# Patient Record
Sex: FEMALE | Race: WHITE | NOT HISPANIC OR LATINO | Employment: FULL TIME | URBAN - METROPOLITAN AREA
[De-identification: names, ages, dates, MRNs, and addresses within clinical notes are randomized per-mention and may not be internally consistent; named-entity substitution may affect disease eponyms.]

---

## 2017-03-21 ENCOUNTER — TRANSCRIBE ORDERS (OUTPATIENT)
Dept: ADMINISTRATIVE | Facility: HOSPITAL | Age: 47
End: 2017-03-21

## 2017-03-21 DIAGNOSIS — Z12.31 VISIT FOR SCREENING MAMMOGRAM: Primary | ICD-10-CM

## 2017-04-12 ENCOUNTER — APPOINTMENT (OUTPATIENT)
Dept: MAMMOGRAPHY | Facility: HOSPITAL | Age: 47
End: 2017-04-12
Attending: OBSTETRICS & GYNECOLOGY

## 2017-04-19 ENCOUNTER — HOSPITAL ENCOUNTER (OUTPATIENT)
Dept: MAMMOGRAPHY | Facility: HOSPITAL | Age: 47
Discharge: HOME | End: 2017-04-19
Attending: OBSTETRICS & GYNECOLOGY | Admitting: OBSTETRICS & GYNECOLOGY

## 2017-04-19 DIAGNOSIS — Z12.31 VISIT FOR SCREENING MAMMOGRAM: ICD-10-CM

## 2017-04-19 PROCEDURE — G0202 SCR MAMMO BI INCL CAD: HCPCS

## 2017-04-19 PROCEDURE — 77063 BREAST TOMOSYNTHESIS BI: CPT

## 2017-05-18 ENCOUNTER — TRANSCRIBE ORDERS (OUTPATIENT)
Dept: ADMINISTRATIVE | Facility: HOSPITAL | Age: 47
End: 2017-05-18

## 2017-05-18 DIAGNOSIS — H57.12 EYE PAIN, LEFT: Primary | ICD-10-CM

## 2017-05-24 ENCOUNTER — HOSPITAL ENCOUNTER (OUTPATIENT)
Dept: MRI IMAGING | Facility: HOSPITAL | Age: 47
Discharge: HOME | End: 2017-05-24
Attending: OPHTHALMOLOGY

## 2017-05-24 ENCOUNTER — HOSPITAL ENCOUNTER (OUTPATIENT)
Dept: MRI IMAGING | Facility: HOSPITAL | Age: 47
Discharge: HOME | End: 2017-05-24
Attending: OPHTHALMOLOGY | Admitting: OPHTHALMOLOGY

## 2017-05-24 DIAGNOSIS — H57.12 EYE PAIN, LEFT: ICD-10-CM

## 2017-05-24 PROCEDURE — 70543 MRI ORBT/FAC/NCK W/O &W/DYE: CPT

## 2017-05-24 PROCEDURE — A9577 INJ MULTIHANCE: HCPCS | Performed by: OPHTHALMOLOGY

## 2017-05-24 PROCEDURE — 70553 MRI BRAIN STEM W/O & W/DYE: CPT

## 2017-05-24 PROCEDURE — 0 GADOBENATE DIMEGLUMINE 529 MG/ML SOLUTION: Performed by: OPHTHALMOLOGY

## 2017-05-24 RX ADMIN — GADOBENATE DIMEGLUMINE 16 ML: 529 INJECTION, SOLUTION INTRAVENOUS at 20:04

## 2017-07-07 ENCOUNTER — APPOINTMENT (OUTPATIENT)
Dept: CT IMAGING | Facility: HOSPITAL | Age: 47
End: 2017-07-07

## 2017-07-07 ENCOUNTER — HOSPITAL ENCOUNTER (EMERGENCY)
Facility: HOSPITAL | Age: 47
Discharge: HOME | End: 2017-07-07
Attending: EMERGENCY MEDICINE | Admitting: EMERGENCY MEDICINE

## 2017-07-07 VITALS
RESPIRATION RATE: 18 BRPM | HEART RATE: 72 BPM | WEIGHT: 168 LBS | BODY MASS INDEX: 27 KG/M2 | SYSTOLIC BLOOD PRESSURE: 141 MMHG | HEIGHT: 66 IN | TEMPERATURE: 98.4 F | OXYGEN SATURATION: 98 % | DIASTOLIC BLOOD PRESSURE: 81 MMHG

## 2017-07-07 DIAGNOSIS — R19.8 CHANGE IN BOWEL FUNCTION: Primary | ICD-10-CM

## 2017-07-07 DIAGNOSIS — R10.9 ABDOMINAL CRAMPING: ICD-10-CM

## 2017-07-07 LAB
ALBUMIN SERPL-MCNC: 4.5 G/DL (ref 3.5–5.2)
ALBUMIN/GLOB SERPL: 1.6 G/DL
ALP SERPL-CCNC: 43 U/L (ref 39–117)
ALT SERPL W P-5'-P-CCNC: 11 U/L (ref 1–33)
ANION GAP SERPL CALCULATED.3IONS-SCNC: 15.1 MMOL/L
AST SERPL-CCNC: 13 U/L (ref 1–32)
BACTERIA UR QL AUTO: ABNORMAL /HPF
BASOPHILS # BLD AUTO: 0.02 10*3/MM3 (ref 0–0.2)
BASOPHILS NFR BLD AUTO: 0.4 % (ref 0–1.5)
BILIRUB SERPL-MCNC: 0.2 MG/DL (ref 0.1–1.2)
BILIRUB UR QL STRIP: NEGATIVE
BUN BLD-MCNC: 8 MG/DL (ref 6–20)
BUN/CREAT SERPL: 10.3 (ref 7–25)
CALCIUM SPEC-SCNC: 9.8 MG/DL (ref 8.6–10.5)
CHLORIDE SERPL-SCNC: 101 MMOL/L (ref 98–107)
CLARITY UR: CLEAR
CO2 SERPL-SCNC: 23.9 MMOL/L (ref 22–29)
COLOR UR: YELLOW
CREAT BLD-MCNC: 0.78 MG/DL (ref 0.57–1)
DEPRECATED RDW RBC AUTO: 42.1 FL (ref 37–54)
EOSINOPHIL # BLD AUTO: 0.04 10*3/MM3 (ref 0–0.7)
EOSINOPHIL NFR BLD AUTO: 0.9 % (ref 0.3–6.2)
ERYTHROCYTE [DISTWIDTH] IN BLOOD BY AUTOMATED COUNT: 12.8 % (ref 11.7–13)
GFR SERPL CREATININE-BSD FRML MDRD: 79 ML/MIN/1.73
GLOBULIN UR ELPH-MCNC: 2.9 GM/DL
GLUCOSE BLD-MCNC: 98 MG/DL (ref 65–99)
GLUCOSE UR STRIP-MCNC: NEGATIVE MG/DL
HCG SERPL QL: NEGATIVE
HCT VFR BLD AUTO: 39 % (ref 35.6–45.5)
HGB BLD-MCNC: 12.7 G/DL (ref 11.9–15.5)
HGB UR QL STRIP.AUTO: ABNORMAL
HOLD SPECIMEN: NORMAL
HYALINE CASTS UR QL AUTO: ABNORMAL /LPF
IMM GRANULOCYTES # BLD: 0 10*3/MM3 (ref 0–0.03)
IMM GRANULOCYTES NFR BLD: 0 % (ref 0–0.5)
KETONES UR QL STRIP: NEGATIVE
LEUKOCYTE ESTERASE UR QL STRIP.AUTO: NEGATIVE
LIPASE SERPL-CCNC: 27 U/L (ref 13–60)
LYMPHOCYTES # BLD AUTO: 1.43 10*3/MM3 (ref 0.9–4.8)
LYMPHOCYTES NFR BLD AUTO: 30.6 % (ref 19.6–45.3)
MCH RBC QN AUTO: 29.5 PG (ref 26.9–32)
MCHC RBC AUTO-ENTMCNC: 32.6 G/DL (ref 32.4–36.3)
MCV RBC AUTO: 90.5 FL (ref 80.5–98.2)
MONOCYTES # BLD AUTO: 0.27 10*3/MM3 (ref 0.2–1.2)
MONOCYTES NFR BLD AUTO: 5.8 % (ref 5–12)
NEUTROPHILS # BLD AUTO: 2.92 10*3/MM3 (ref 1.9–8.1)
NEUTROPHILS NFR BLD AUTO: 62.3 % (ref 42.7–76)
NITRITE UR QL STRIP: NEGATIVE
PH UR STRIP.AUTO: 6.5 [PH] (ref 5–8)
PLATELET # BLD AUTO: 303 10*3/MM3 (ref 140–500)
PMV BLD AUTO: 10.9 FL (ref 6–12)
POTASSIUM BLD-SCNC: 3.7 MMOL/L (ref 3.5–5.2)
PROT SERPL-MCNC: 7.4 G/DL (ref 6–8.5)
PROT UR QL STRIP: NEGATIVE
RBC # BLD AUTO: 4.31 10*6/MM3 (ref 3.9–5.2)
RBC # UR: ABNORMAL /HPF
REF LAB TEST METHOD: ABNORMAL
SODIUM BLD-SCNC: 140 MMOL/L (ref 136–145)
SP GR UR STRIP: <1.005 (ref 1–1.03)
SQUAMOUS #/AREA URNS HPF: ABNORMAL /HPF
UROBILINOGEN UR QL STRIP: ABNORMAL
WBC NRBC COR # BLD: 4.68 10*3/MM3 (ref 4.5–10.7)
WBC UR QL AUTO: ABNORMAL /HPF
WHOLE BLOOD HOLD SPECIMEN: NORMAL
WHOLE BLOOD HOLD SPECIMEN: NORMAL

## 2017-07-07 PROCEDURE — 85025 COMPLETE CBC W/AUTO DIFF WBC: CPT | Performed by: EMERGENCY MEDICINE

## 2017-07-07 PROCEDURE — 74177 CT ABD & PELVIS W/CONTRAST: CPT

## 2017-07-07 PROCEDURE — 83690 ASSAY OF LIPASE: CPT | Performed by: EMERGENCY MEDICINE

## 2017-07-07 PROCEDURE — 84703 CHORIONIC GONADOTROPIN ASSAY: CPT | Performed by: EMERGENCY MEDICINE

## 2017-07-07 PROCEDURE — 0 IOPAMIDOL 61 % SOLUTION: Performed by: EMERGENCY MEDICINE

## 2017-07-07 PROCEDURE — 96360 HYDRATION IV INFUSION INIT: CPT

## 2017-07-07 PROCEDURE — 80053 COMPREHEN METABOLIC PANEL: CPT | Performed by: EMERGENCY MEDICINE

## 2017-07-07 PROCEDURE — 36415 COLL VENOUS BLD VENIPUNCTURE: CPT | Performed by: EMERGENCY MEDICINE

## 2017-07-07 PROCEDURE — 81001 URINALYSIS AUTO W/SCOPE: CPT | Performed by: EMERGENCY MEDICINE

## 2017-07-07 PROCEDURE — 99284 EMERGENCY DEPT VISIT MOD MDM: CPT

## 2017-07-07 RX ORDER — SODIUM CHLORIDE 0.9 % (FLUSH) 0.9 %
10 SYRINGE (ML) INJECTION AS NEEDED
Status: DISCONTINUED | OUTPATIENT
Start: 2017-07-07 | End: 2017-07-07 | Stop reason: HOSPADM

## 2017-07-07 RX ADMIN — IOPAMIDOL 85 ML: 612 INJECTION, SOLUTION INTRAVENOUS at 12:07

## 2017-07-07 RX ADMIN — SODIUM CHLORIDE 1000 ML: 9 INJECTION, SOLUTION INTRAVENOUS at 12:28

## 2017-07-07 NOTE — ED PROVIDER NOTES
EMERGENCY DEPARTMENT ENCOUNTER    CHIEF COMPLAINT  Chief Complaint: Abdominal Pain  History given by: Patient  History limited by: Nothing  Room Number: 31/31  PMD: Naman Zacarias MD      HPI:  Pt is a 47 y.o. female who presents to the ED complaining of progressively worsening LUQ discomfort which began 2-3 days ago and worsened this morning while she was at work. The patient has also experienced unusual constipation and her last normal BM was approximately 2 weeks ago after an enema. Patient has been taking Miralax and laxatives over the past two weeks and she's only been producing very small amount of stool. Patient states that she has still been passing gas but denies any fever, chills, nausea or vomiting since onset. She notes that she is currently on her menstrual cycle and has no other complaints.      Duration:  2-3 days  Onset: Gradual  Timing: Constant  Location: LUQ  Radiation: None  Quality: Dull  Intensity/Severity: Moderate  Progression: Worsened  Associated Symptoms: Abdominal pain, constipation   Aggravating Factors: Unknown  Alleviating Factors: Unknown  Previous Episodes: None mentioned  Treatment before arrival: Miralax, laxative, enema      PAST MEDICAL HISTORY  Active Ambulatory Problems     Diagnosis Date Noted   • No Active Ambulatory Problems     Resolved Ambulatory Problems     Diagnosis Date Noted   • No Resolved Ambulatory Problems     No Additional Past Medical History       PAST SURGICAL HISTORY  Past Surgical History:   Procedure Laterality Date   • BREAST BIOPSY     • LEEP         FAMILY HISTORY  History reviewed. No pertinent family history.    SOCIAL HISTORY  Social History     Social History   • Marital status:      Spouse name: N/A   • Number of children: N/A   • Years of education: N/A     Occupational History   • Not on file.     Social History Main Topics   • Smoking status: Never Smoker   • Smokeless tobacco: Not on file   • Alcohol use No   • Drug use: No   •  Sexual activity: Not on file     Other Topics Concern   • Not on file     Social History Narrative   • No narrative on file       ALLERGIES  Review of patient's allergies indicates no known allergies.    REVIEW OF SYSTEMS  Review of Systems   Constitutional: Negative for chills.   HENT: Negative for congestion, rhinorrhea and sore throat.    Eyes: Negative for pain.   Respiratory: Negative for cough and shortness of breath.    Cardiovascular: Negative for chest pain, palpitations and leg swelling.   Gastrointestinal: Positive for abdominal pain and constipation. Negative for diarrhea, nausea and vomiting.   Genitourinary: Negative for difficulty urinating, dysuria, flank pain and frequency.   Musculoskeletal: Negative for myalgias, neck pain and neck stiffness.   Skin: Negative for rash.   Neurological: Negative for dizziness, speech difficulty, weakness, light-headedness, numbness and headaches.   Psychiatric/Behavioral: Negative.    All other systems reviewed and are negative.      PHYSICAL EXAM  ED Triage Vitals   Temp Heart Rate Resp BP SpO2   07/07/17 1101 07/07/17 1101 07/07/17 1101 07/07/17 1106 07/07/17 1101   98 °F (36.7 °C) 103 18 182/106 100 %      Temp src Heart Rate Source Patient Position BP Location FiO2 (%)   07/07/17 1101 07/07/17 1101 07/07/17 1106 -- --   Tympanic Monitor Lying         Physical Exam   Constitutional: She is oriented to person, place, and time and well-developed, well-nourished, and in no distress. No distress.   HENT:   Head: Normocephalic.   Eyes: EOM are normal. Pupils are equal, round, and reactive to light.   Neck: Normal range of motion.   Cardiovascular: Normal rate and regular rhythm.    No murmur heard.  Pulmonary/Chest: Effort normal and breath sounds normal. No respiratory distress.   Abdominal: Soft. There is tenderness in the right lower quadrant and left upper quadrant. There is no rebound and no guarding.   Musculoskeletal: Normal range of motion. She exhibits no  edema.   Neurological: She is alert and oriented to person, place, and time.   Skin: Skin is warm and dry. No rash noted.   Psychiatric: Mood and affect normal.   Nursing note and vitals reviewed.      LAB RESULTS  Lab Results (last 24 hours)     Procedure Component Value Units Date/Time    CBC & Differential [[id number]] Collected:  07/07/17 1116    Specimen:  Blood Updated:  07/07/17 1136    Narrative:       The following orders were created for panel order CBC & Differential.  Procedure                               Abnormality         Status                     ---------                               -----------         ------                     CBC Auto Differential[[id number]]        Normal              Final result                 Please view results for these tests on the individual orders.    Comprehensive Metabolic Panel [[id number]] Collected:  07/07/17 1116    Specimen:  Blood Updated:  07/07/17 1147     Glucose 98 mg/dL      BUN 8 mg/dL      Creatinine 0.78 mg/dL      Sodium 140 mmol/L      Potassium 3.7 mmol/L      Chloride 101 mmol/L      CO2 23.9 mmol/L      Calcium 9.8 mg/dL      Total Protein 7.4 g/dL      Albumin 4.50 g/dL      ALT (SGPT) 11 U/L      AST (SGOT) 13 U/L      Alkaline Phosphatase 43 U/L      Total Bilirubin 0.2 mg/dL      eGFR Non African Amer 79 mL/min/1.73      Globulin 2.9 gm/dL      A/G Ratio 1.6 g/dL      BUN/Creatinine Ratio 10.3     Anion Gap 15.1 mmol/L     Lipase [[id number]]  (Normal) Collected:  07/07/17 1116    Specimen:  Blood Updated:  07/07/17 1147     Lipase 27 U/L     hCG, Serum, Qualitative [[id number]]  (Normal) Collected:  07/07/17 1116    Specimen:  Blood Updated:  07/07/17 1150     HCG Qualitative Negative    CBC Auto Differential [[id number]]  (Normal) Collected:  07/07/17 1116    Specimen:  Blood Updated:  07/07/17 1136     WBC 4.68 10*3/mm3      RBC 4.31 10*6/mm3      Hemoglobin 12.7 g/dL      Hematocrit 39.0 %      MCV 90.5 fL      MCH 29.5 pg      MCHC 32.6 g/dL       RDW 12.8 %      RDW-SD 42.1 fl      MPV 10.9 fL      Platelets 303 10*3/mm3      Neutrophil % 62.3 %      Lymphocyte % 30.6 %      Monocyte % 5.8 %      Eosinophil % 0.9 %      Basophil % 0.4 %      Immature Grans % 0.0 %      Neutrophils, Absolute 2.92 10*3/mm3      Lymphocytes, Absolute 1.43 10*3/mm3      Monocytes, Absolute 0.27 10*3/mm3      Eosinophils, Absolute 0.04 10*3/mm3      Basophils, Absolute 0.02 10*3/mm3      Immature Grans, Absolute 0.00 10*3/mm3     Urinalysis With / Culture If Indicated [[id number]]  (Abnormal) Collected:  07/07/17 1221    Specimen:  Urine from Urine, Clean Catch Updated:  07/07/17 1241     Color, UA Yellow     Appearance, UA Clear     pH, UA 6.5     Specific Gravity, UA <1.005 (L)     Glucose, UA Negative     Ketones, UA Negative     Bilirubin, UA Negative     Blood, UA Moderate (2+) (A)     Protein, UA Negative     Leuk Esterase, UA Negative     Nitrite, UA Negative     Urobilinogen, UA 0.2 E.U./dL    Urinalysis, Microscopic Only [[id number]]  (Abnormal) Collected:  07/07/17 1221    Specimen:  Urine from Urine, Clean Catch Updated:  07/07/17 1315     RBC, UA 6-12 (A) /HPF      WBC, UA 0-2 /HPF      Bacteria, UA None Seen /HPF      Squamous Epithelial Cells, UA 7-12 (A) /HPF      Hyaline Casts, UA 3-6 /LPF      Methodology Manual Light Microscopy          I ordered the above labs and reviewed the results    RADIOLOGY  CT Abdomen Pelvis With Contrast   Preliminary Result   No acute abnormality is seen.       Discussed with Dr. Rousseau.             12:35  Reviewed CT abd/pel - Negative acute. Normal amount of stool in colon. No obstruction. Independently viewed by me. Interpreted by radiologist. Discussed with     I ordered the above noted radiological studies. Interpreted by radiologist. Discussed with radiologist (). Reviewed by me in PACS.       PROCEDURES  Procedures      PROGRESS AND CONSULTS  ED Course     11:08  Ordered Labs for further evaluation.      11:48  Ordered CT abd/pel for further evaluation. Ordered IVF for hydration.     14:42  Rechecked the patient and she is resting comfortably. Discussed the patient's pertinent labs and imaging results, including CT abd/pel and labs show nothing acute. Discussed plan to discharge the patient home and recommended follow up with her PCP as needed. Patient agrees with the plan and all questions were addressed.     Latest vital signs   BP- 140/83 HR- 70 Temp- 98 °F (36.7 °C) (Tympanic) O2 sat- 99%      MEDICAL DECISION MAKING  Results were reviewed/discussed with the patient and they were also made aware of online access. Pt also made aware that some labs, such as cultures, will not be resulted during ER visit and follow up with PMD is necessary.     MDM  Number of Diagnoses or Management Options  Abdominal cramping:   Change in bowel function:   Diagnosis management comments: Patient presented the ER complaining of constipation and abdominal cramping.  CT of the abdomen and pelvis did not show any evidence of constipation, colitis, diverticulitis, or bowel obstruction.  Labs were unremarkable.  Patient be discharged with prescription for magnesium citrate.  She was advised to eat a high-fiber diet and drink plenty of fluids.       Amount and/or Complexity of Data Reviewed  Clinical lab tests: reviewed and ordered (Labs unremarkable)  Tests in the radiology section of CPT®: reviewed and ordered (CT abd/pel - Negative acute. Normal amount of stool in colon. No obstruction. )  Discussion of test results with the performing providers: yes  Decide to obtain previous medical records or to obtain history from someone other than the patient: yes  Review and summarize past medical records: yes (Last seen in ER Aug 2015 for right foot injury and atypical chest pain. )    Patient Progress  Patient progress: stable         DIAGNOSIS  Final diagnoses:   Change in bowel function   Abdominal cramping        DISPOSITION  DISCHARGE    Patient discharged in stable condition.    Reviewed implications of results, diagnosis, meds, responsibility to follow up, warning signs and symptoms of possible worsening, potential complications and reasons to return to ER, including worsening abdominal pain.     Patient/Family voiced understanding of above instructions.    Discussed plan for discharge, as there is no emergent indication for admission.  Pt/family is agreeable and understands need for follow up and repeat testing.  Pt is aware that discharge does not mean that nothing is wrong but it indicates no emergency is present that requires admission and they must continue care with follow-up as given below or physician of their choice.     FOLLOW-UP  Naman Zacarias MD  [address] [address]  [phone]    Call in 3 days  As needed         Medication List      New Prescriptions          magnesium citrate solution   Take 148 mL by mouth As Needed (constipation). May repeat dose x 1 as   needed               Latest Documented Vital Signs:  As of 2:48 PM  BP- 140/83 HR- 70 Temp- 98 °F (36.7 °C) (Tympanic) O2 sat- 99%    --  Documentation assistance provided by teresa Emerson for .  Information recorded by the scrlidiae was done at my direction and has been verified and validated by me.        Tobias Emerson  07/07/17 6216       Palomo Rousseau MD  07/07/17 4789

## 2017-07-07 NOTE — DISCHARGE INSTRUCTIONS
Drink plenty of fluids.  Take medication as directed.  Follow-up with your doctor next week if symptoms persist.  Return to emergency department for worsening pain, vomiting, fever, blood in your stool, or other concern.

## 2017-07-07 NOTE — ED TRIAGE NOTES
Pt to ED for LLQ abdominal pain and pressure and constipation. States last normal bm was three weeks ago after enema. States took three laxatives yesterday with minimal output

## 2017-09-11 ENCOUNTER — LAB (OUTPATIENT)
Dept: LAB | Facility: HOSPITAL | Age: 47
End: 2017-09-11

## 2017-09-11 ENCOUNTER — TRANSCRIBE ORDERS (OUTPATIENT)
Dept: ADMINISTRATIVE | Facility: HOSPITAL | Age: 47
End: 2017-09-11

## 2017-09-11 DIAGNOSIS — R51.9 HEADACHE, UNSPECIFIED HEADACHE TYPE: ICD-10-CM

## 2017-09-11 DIAGNOSIS — R51.9 HEADACHE, UNSPECIFIED HEADACHE TYPE: Primary | ICD-10-CM

## 2017-09-11 LAB
CHOLEST SERPL-MCNC: 197 MG/DL (ref 0–200)
HBA1C MFR BLD: 5.3 % (ref 4.8–5.6)
HDLC SERPL-MCNC: 70 MG/DL (ref 40–60)
LDLC SERPL CALC-MCNC: 97 MG/DL (ref 0–100)
LDLC/HDLC SERPL: 1.39 {RATIO}
T-UPTAKE NFR SERPL: 1.33 TBI (ref 0.8–1.3)
T4 SERPL-MCNC: 7.76 MCG/DL (ref 4.5–11.7)
TRIGL SERPL-MCNC: 150 MG/DL (ref 0–150)
TSH SERPL DL<=0.05 MIU/L-ACNC: 2.7 MIU/ML (ref 0.27–4.2)
VIT B12 BLD-MCNC: 237 PG/ML (ref 211–946)
VLDLC SERPL-MCNC: 30 MG/DL (ref 5–40)

## 2017-09-11 PROCEDURE — 84436 ASSAY OF TOTAL THYROXINE: CPT

## 2017-09-11 PROCEDURE — 82746 ASSAY OF FOLIC ACID SERUM: CPT

## 2017-09-11 PROCEDURE — 80061 LIPID PANEL: CPT

## 2017-09-11 PROCEDURE — 84479 ASSAY OF THYROID (T3 OR T4): CPT

## 2017-09-11 PROCEDURE — 83036 HEMOGLOBIN GLYCOSYLATED A1C: CPT

## 2017-09-11 PROCEDURE — 82607 VITAMIN B-12: CPT

## 2017-09-11 PROCEDURE — 36415 COLL VENOUS BLD VENIPUNCTURE: CPT

## 2017-09-11 PROCEDURE — 84443 ASSAY THYROID STIM HORMONE: CPT

## 2017-09-12 LAB — FOLATE SERPL-MCNC: 8.14 NG/ML (ref 4.78–24.2)

## 2017-12-07 ENCOUNTER — TRANSCRIBE ORDERS (OUTPATIENT)
Dept: ADMINISTRATIVE | Facility: HOSPITAL | Age: 47
End: 2017-12-07

## 2017-12-18 ENCOUNTER — HOSPITAL ENCOUNTER (OUTPATIENT)
Dept: ULTRASOUND IMAGING | Facility: HOSPITAL | Age: 47
Discharge: HOME | End: 2017-12-18

## 2017-12-18 ENCOUNTER — HOSPITAL ENCOUNTER (OUTPATIENT)
Dept: MAMMOGRAPHY | Facility: HOSPITAL | Age: 47
Discharge: HOME | End: 2017-12-18
Admitting: OBSTETRICS & GYNECOLOGY

## 2017-12-18 DIAGNOSIS — IMO0002 MASS: ICD-10-CM

## 2017-12-18 PROCEDURE — G0206 DX MAMMO INCL CAD UNI: HCPCS

## 2017-12-18 PROCEDURE — 76642 ULTRASOUND BREAST LIMITED: CPT

## 2018-03-26 ENCOUNTER — TRANSCRIBE ORDERS (OUTPATIENT)
Dept: LAB | Facility: HOSPITAL | Age: 48
End: 2018-03-26

## 2018-03-26 ENCOUNTER — LAB (OUTPATIENT)
Dept: LAB | Facility: HOSPITAL | Age: 48
End: 2018-03-26

## 2018-03-26 DIAGNOSIS — E53.8 VITAMIN B 12 DEFICIENCY: ICD-10-CM

## 2018-03-26 DIAGNOSIS — E53.8 VITAMIN B 12 DEFICIENCY: Primary | ICD-10-CM

## 2018-03-26 LAB
FOLATE SERPL-MCNC: 9.38 NG/ML (ref 4.78–24.2)
VIT B12 BLD-MCNC: 333 PG/ML (ref 211–946)

## 2018-03-26 PROCEDURE — 36415 COLL VENOUS BLD VENIPUNCTURE: CPT

## 2018-03-26 PROCEDURE — 82607 VITAMIN B-12: CPT

## 2018-03-26 PROCEDURE — 82746 ASSAY OF FOLIC ACID SERUM: CPT

## 2018-06-05 ENCOUNTER — TRANSCRIBE ORDERS (OUTPATIENT)
Dept: ADMINISTRATIVE | Facility: HOSPITAL | Age: 48
End: 2018-06-05

## 2018-06-05 DIAGNOSIS — R51.9 NONINTRACTABLE HEADACHE, UNSPECIFIED CHRONICITY PATTERN, UNSPECIFIED HEADACHE TYPE: ICD-10-CM

## 2018-06-05 DIAGNOSIS — R93.89 ABNORMAL MRI: Primary | ICD-10-CM

## 2018-06-14 ENCOUNTER — HOSPITAL ENCOUNTER (OUTPATIENT)
Dept: MRI IMAGING | Facility: HOSPITAL | Age: 48
Discharge: HOME | End: 2018-06-14
Admitting: PSYCHIATRY & NEUROLOGY

## 2018-06-14 DIAGNOSIS — R93.89 ABNORMAL MRI: ICD-10-CM

## 2018-06-14 DIAGNOSIS — R51.9 NONINTRACTABLE HEADACHE, UNSPECIFIED CHRONICITY PATTERN, UNSPECIFIED HEADACHE TYPE: ICD-10-CM

## 2018-06-14 PROCEDURE — 0 GADOBENATE DIMEGLUMINE 529 MG/ML SOLUTION: Performed by: PSYCHIATRY & NEUROLOGY

## 2018-06-14 PROCEDURE — 70553 MRI BRAIN STEM W/O & W/DYE: CPT

## 2018-06-14 PROCEDURE — A9577 INJ MULTIHANCE: HCPCS | Performed by: PSYCHIATRY & NEUROLOGY

## 2018-06-14 PROCEDURE — 82565 ASSAY OF CREATININE: CPT

## 2018-06-14 RX ADMIN — GADOBENATE DIMEGLUMINE 15 ML: 529 INJECTION, SOLUTION INTRAVENOUS at 18:36

## 2018-06-15 LAB — CREAT BLDA-MCNC: 0.7 MG/DL (ref 0.6–1.3)

## 2018-10-02 PROBLEM — G43.909 MIGRAINES: Status: ACTIVE | Noted: 2018-10-02

## 2018-10-02 PROBLEM — I10 ESSENTIAL HYPERTENSION: Status: ACTIVE | Noted: 2018-10-02

## 2018-10-02 PROBLEM — D64.9 ANEMIA: Status: ACTIVE | Noted: 2018-10-02

## 2018-10-02 PROBLEM — K59.00 CONSTIPATION: Status: ACTIVE | Noted: 2018-10-02

## 2018-10-02 PROBLEM — L71.9 ROSACEA: Status: ACTIVE | Noted: 2018-10-02

## 2018-10-02 PROBLEM — K58.9 IBS (IRRITABLE BOWEL SYNDROME): Status: ACTIVE | Noted: 2018-10-02

## 2018-10-02 PROBLEM — C53.9 CERVICAL CA (HCC): Status: ACTIVE | Noted: 2018-10-02

## 2018-10-02 PROBLEM — F41.9 CHRONIC ANXIETY: Status: ACTIVE | Noted: 2018-10-02

## 2018-10-02 RX ORDER — DESOGESTREL AND ETHINYL ESTRADIOL 0.15-0.03
1 KIT ORAL
COMMUNITY
Start: 2017-12-05 | End: 2022-02-02

## 2018-10-02 RX ORDER — CYANOCOBALAMIN (VITAMIN B-12) 2000 MCG
2000 TABLET ORAL
COMMUNITY
Start: 2018-08-27 | End: 2022-02-02

## 2018-10-02 RX ORDER — PROPRANOLOL HYDROCHLORIDE 80 MG/1
80 CAPSULE, EXTENDED RELEASE ORAL
COMMUNITY
Start: 2018-03-13 | End: 2022-02-02

## 2018-10-03 ENCOUNTER — OFFICE VISIT (OUTPATIENT)
Dept: SURGERY | Facility: CLINIC | Age: 48
End: 2018-10-03

## 2018-10-03 VITALS
HEIGHT: 66 IN | TEMPERATURE: 98.1 F | OXYGEN SATURATION: 98 % | BODY MASS INDEX: 29.73 KG/M2 | WEIGHT: 185 LBS | DIASTOLIC BLOOD PRESSURE: 80 MMHG | HEART RATE: 89 BPM | SYSTOLIC BLOOD PRESSURE: 115 MMHG | RESPIRATION RATE: 18 BRPM

## 2018-10-03 DIAGNOSIS — Z83.71 FAMILY HISTORY OF POLYPS IN THE COLON: ICD-10-CM

## 2018-10-03 DIAGNOSIS — K62.89 NODULE OF ANUS: Primary | ICD-10-CM

## 2018-10-03 PROCEDURE — 99244 OFF/OP CNSLTJ NEW/EST MOD 40: CPT | Performed by: COLON & RECTAL SURGERY

## 2018-10-03 PROCEDURE — 46600 DIAGNOSTIC ANOSCOPY SPX: CPT | Performed by: COLON & RECTAL SURGERY

## 2018-10-03 RX ORDER — SODIUM CHLORIDE, SODIUM LACTATE, POTASSIUM CHLORIDE, CALCIUM CHLORIDE 600; 310; 30; 20 MG/100ML; MG/100ML; MG/100ML; MG/100ML
30 INJECTION, SOLUTION INTRAVENOUS CONTINUOUS
Status: CANCELLED | OUTPATIENT
Start: 2018-10-03

## 2019-01-19 ENCOUNTER — HOSPITAL ENCOUNTER (OUTPATIENT)
Dept: GENERAL RADIOLOGY | Facility: HOSPITAL | Age: 49
Discharge: HOME | End: 2019-01-19

## 2019-01-19 ENCOUNTER — HOSPITAL ENCOUNTER (OUTPATIENT)
Dept: GENERAL RADIOLOGY | Facility: HOSPITAL | Age: 49
Discharge: HOME | End: 2019-01-19
Admitting: INTERNAL MEDICINE

## 2019-01-19 DIAGNOSIS — R52 PAIN: ICD-10-CM

## 2019-01-19 PROCEDURE — 71046 X-RAY EXAM CHEST 2 VIEWS: CPT

## 2019-01-19 PROCEDURE — 72050 X-RAY EXAM NECK SPINE 4/5VWS: CPT

## 2019-02-23 ENCOUNTER — LAB REQUISITION (OUTPATIENT)
Dept: LAB | Facility: HOSPITAL | Age: 49
End: 2019-02-23

## 2019-02-23 DIAGNOSIS — Z00.00 ENCOUNTER FOR GENERAL ADULT MEDICAL EXAMINATION WITHOUT ABNORMAL FINDINGS: ICD-10-CM

## 2019-02-23 LAB
25(OH)D3 SERPL-MCNC: 12.7 NG/ML (ref 30–100)
ALBUMIN SERPL-MCNC: 4.5 G/DL (ref 3.5–5.2)
ALBUMIN/GLOB SERPL: 1.7 G/DL
ALP SERPL-CCNC: 39 U/L (ref 39–117)
ALT SERPL W P-5'-P-CCNC: 13 U/L (ref 1–33)
ANION GAP SERPL CALCULATED.3IONS-SCNC: 14.9 MMOL/L
AST SERPL-CCNC: 10 U/L (ref 1–32)
BASOPHILS # BLD AUTO: 0.04 10*3/MM3 (ref 0–0.2)
BASOPHILS NFR BLD AUTO: 0.6 % (ref 0–1.5)
BILIRUB SERPL-MCNC: 0.3 MG/DL (ref 0.1–1.2)
BILIRUB UR QL STRIP: NEGATIVE
BUN BLD-MCNC: 10 MG/DL (ref 6–20)
BUN/CREAT SERPL: 13.2 (ref 7–25)
CALCIUM SPEC-SCNC: 9.4 MG/DL (ref 8.6–10.5)
CHLORIDE SERPL-SCNC: 102 MMOL/L (ref 98–107)
CHOLEST SERPL-MCNC: 223 MG/DL (ref 0–200)
CLARITY UR: ABNORMAL
CO2 SERPL-SCNC: 23.1 MMOL/L (ref 22–29)
COLOR UR: YELLOW
CREAT BLD-MCNC: 0.76 MG/DL (ref 0.57–1)
DEPRECATED RDW RBC AUTO: 42.9 FL (ref 37–54)
EOSINOPHIL # BLD AUTO: 0.05 10*3/MM3 (ref 0–0.4)
EOSINOPHIL NFR BLD AUTO: 0.8 % (ref 0.3–6.2)
ERYTHROCYTE [DISTWIDTH] IN BLOOD BY AUTOMATED COUNT: 12.5 % (ref 12.3–15.4)
GFR SERPL CREATININE-BSD FRML MDRD: 81 ML/MIN/1.73
GLOBULIN UR ELPH-MCNC: 2.7 GM/DL
GLUCOSE BLD-MCNC: 95 MG/DL (ref 65–99)
GLUCOSE UR STRIP-MCNC: NEGATIVE MG/DL
HCT VFR BLD AUTO: 40.6 % (ref 34–46.6)
HDLC SERPL-MCNC: 66 MG/DL (ref 40–60)
HGB BLD-MCNC: 12.7 G/DL (ref 12–15.9)
HGB UR QL STRIP.AUTO: NEGATIVE
IMM GRANULOCYTES # BLD AUTO: 0.01 10*3/MM3 (ref 0–0.05)
IMM GRANULOCYTES NFR BLD AUTO: 0.2 % (ref 0–0.5)
KETONES UR QL STRIP: NEGATIVE
LDLC SERPL CALC-MCNC: 119 MG/DL (ref 0–100)
LDLC/HDLC SERPL: 1.81 {RATIO}
LEUKOCYTE ESTERASE UR QL STRIP.AUTO: ABNORMAL
LYMPHOCYTES # BLD AUTO: 1.52 10*3/MM3 (ref 0.7–3.1)
LYMPHOCYTES NFR BLD AUTO: 23 % (ref 19.6–45.3)
MCH RBC QN AUTO: 29.1 PG (ref 26.6–33)
MCHC RBC AUTO-ENTMCNC: 31.3 G/DL (ref 31.5–35.7)
MCV RBC AUTO: 93.1 FL (ref 79–97)
MONOCYTES # BLD AUTO: 0.37 10*3/MM3 (ref 0.1–0.9)
MONOCYTES NFR BLD AUTO: 5.6 % (ref 5–12)
NEUTROPHILS # BLD AUTO: 4.61 10*3/MM3 (ref 1.4–7)
NEUTROPHILS NFR BLD AUTO: 69.8 % (ref 42.7–76)
NITRITE UR QL STRIP: NEGATIVE
NRBC BLD AUTO-RTO: 0 /100 WBC (ref 0–0)
PH UR STRIP.AUTO: <=5 [PH] (ref 5–8)
PLATELET # BLD AUTO: 354 10*3/MM3 (ref 140–450)
PMV BLD AUTO: 10.6 FL (ref 6–12)
POTASSIUM BLD-SCNC: 4.3 MMOL/L (ref 3.5–5.2)
PROT SERPL-MCNC: 7.2 G/DL (ref 6–8.5)
PROT UR QL STRIP: NEGATIVE
RBC # BLD AUTO: 4.36 10*6/MM3 (ref 3.77–5.28)
SODIUM BLD-SCNC: 140 MMOL/L (ref 136–145)
SP GR UR STRIP: 1.02 (ref 1–1.03)
TRIGL SERPL-MCNC: 188 MG/DL (ref 0–150)
UROBILINOGEN UR QL STRIP: ABNORMAL
VIT B12 BLD-MCNC: 521 PG/ML (ref 211–946)
VLDLC SERPL-MCNC: 37.6 MG/DL (ref 5–40)
WBC NRBC COR # BLD: 6.6 10*3/MM3 (ref 3.4–10.8)

## 2019-02-23 PROCEDURE — 82306 VITAMIN D 25 HYDROXY: CPT | Performed by: INTERNAL MEDICINE

## 2019-02-23 PROCEDURE — 85025 COMPLETE CBC W/AUTO DIFF WBC: CPT | Performed by: INTERNAL MEDICINE

## 2019-02-23 PROCEDURE — 80053 COMPREHEN METABOLIC PANEL: CPT | Performed by: INTERNAL MEDICINE

## 2019-02-23 PROCEDURE — 80061 LIPID PANEL: CPT | Performed by: INTERNAL MEDICINE

## 2019-02-23 PROCEDURE — 81003 URINALYSIS AUTO W/O SCOPE: CPT | Performed by: INTERNAL MEDICINE

## 2019-02-23 PROCEDURE — 82607 VITAMIN B-12: CPT | Performed by: INTERNAL MEDICINE

## 2019-02-23 PROCEDURE — 83921 ORGANIC ACID SINGLE QUANT: CPT | Performed by: INTERNAL MEDICINE

## 2019-02-28 LAB
Lab: NORMAL
METHYLMALONATE SERPL-SCNC: 94 NMOL/L (ref 0–378)

## 2019-04-18 ENCOUNTER — TRANSCRIBE ORDERS (OUTPATIENT)
Dept: PHYSICAL THERAPY | Facility: CLINIC | Age: 49
End: 2019-04-18

## 2019-04-18 ENCOUNTER — TREATMENT (OUTPATIENT)
Dept: PHYSICAL THERAPY | Facility: CLINIC | Age: 49
End: 2019-04-18

## 2019-04-18 DIAGNOSIS — M54.6 PAIN OF THORACIC FACET JOINT: ICD-10-CM

## 2019-04-18 DIAGNOSIS — M54.2 MUSCULOSKELETAL NECK PAIN: Primary | ICD-10-CM

## 2019-04-18 PROCEDURE — 97140 MANUAL THERAPY 1/> REGIONS: CPT | Performed by: PHYSICAL THERAPIST

## 2019-04-18 PROCEDURE — 97110 THERAPEUTIC EXERCISES: CPT | Performed by: PHYSICAL THERAPIST

## 2019-04-18 PROCEDURE — 97161 PT EVAL LOW COMPLEX 20 MIN: CPT | Performed by: PHYSICAL THERAPIST

## 2019-04-18 NOTE — PROGRESS NOTES
Physical Therapy Initial Evaluation and Plan of Care      Patient: Maryana Huber   : 1970  Diagnosis/ICD-10 Code:  Musculoskeletal neck pain [M54.2]  Referring practitioner: Self Referring    Subjective Evaluation    History of Present Illness  Mechanism of injury: Insidious onset L chest/thoracic. Neck hurts as well.  Work for [employer]. 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    Shoulders  Rounded.    Scapulae  Left protracted and right protracted.    Thoracic Spine  Rotated left.    Active Range of Motion   Cervical/Thoracic Spine   Cervical    Flexion: WFL  Extension: WFL  Left lateral flexion: Neck active lateral bend left: tight. WFL  Right lateral flexion: Neck active lateral bend right: tight. WFL  Left rotation: WFL  Right rotation: WFL    Additional Active Range of Motion Details  Decreased cervical and thoracic segmental mobility. See logs    Strength/Myotome Testing     Left Shoulder     Planes of Motion   Flexion: 4   Abduction: 4   External rotation at 0°: 4+   Internal rotation at 0°: 4+     Right Shoulder     Planes of Motion   Flexion: 4   Abduction: 4   External rotation at 0°: 4+   Internal rotation at 0°: 4+     Left Elbow   Flexion: 4  Extension: 4-    Right Elbow   Flexion: 4  Extension: 4-    Tests   Cervical     Left   Negative alar ligament integrity, cervical distraction and Spurling's sign.     Right   Negative alar ligament integrity, cervical distraction and Spurling's sign.          Assessment & Plan     Assessment  Impairments: activity intolerance, impaired physical strength, lacks appropriate home exercise program and pain with function  Assessment details: Pt is a good candidate for skilled  PT intervention, meliza manual therapy for spinal joint mobility, alignment, postural restoration and core strengthening to restore functional AROM and strength to return to previous level of ADL's.  Pt felt better after manual treatment today. Discussed importance of posture as well as supportive bra.  Prognosis: good  Prognosis details: Short Term Goals ( 2 weeks)  1. Pt to be independent with HEP  2. Pt to exhibit increased cervical segmental mobility to allow for increased AROM  3. Pt to demonstrate proper posture without verbal cues  4. Pt to report decreased pain with ADL's    Long Term Goals (  4 weeks)  1. Pt to exhibit sidebending without pain in Cspine  2. Pt to exhibit 5/5 UE strength to allow for lifting and more strenuous daily activities  3. Pt to report ability to work full day without thoracic or neck pain  4. Pt able to perform ADL's and recreational activities without pain   Functional Limitations: lifting, uncomfortable because of pain, sitting and standing  Plan  Therapy options: will be seen for skilled physical therapy services  Planned modality interventions: ultrasound and thermotherapy (hydrocollator packs)  Planned therapy interventions: abdominal trunk stabilization, body mechanics training, functional ROM exercises, home exercise program, joint mobilization, manual therapy, neuromuscular re-education, postural training, spinal/joint mobilization, strengthening, soft tissue mobilization and stretching  Frequency: 1x week  Duration in weeks: 6  Treatment plan discussed with: patient        Manual Therapy:    20     mins  85668;  Therapeutic Exercise:    10     mins  07812;     Neuromuscular Demetria:        mins  65996;    Therapeutic Activity:          mins  60401;     Gait Training:           mins  44456;     Ultrasound:          mins  57472;    Electrical Stimulation:         mins  93255 ( );  Dry Needling          mins self-pay    Timed Treatment:   30   mins   Total Treatment:     55    mins    PT SIGNATURE: Calli Buitrago, PT   [place] License # [id number]  DATE TREATMENT INITIATED: 4/19/2019    Initial Certification  Certification Period: 7/18/2019  I certify that the therapy services are furnished while this patient is under my care.  The services outlined above are required by this patient, and will be reviewed every 90 days.     PHYSICIAN: Referring, Self      DATE:     Please sign and return via fax to [phone].. Thank you, [facility].

## 2019-05-07 ENCOUNTER — TREATMENT (OUTPATIENT)
Dept: PHYSICAL THERAPY | Facility: CLINIC | Age: 49
End: 2019-05-07

## 2019-05-07 DIAGNOSIS — M54.6 PAIN OF THORACIC FACET JOINT: ICD-10-CM

## 2019-05-07 DIAGNOSIS — M54.2 MUSCULOSKELETAL NECK PAIN: Primary | ICD-10-CM

## 2019-05-07 PROCEDURE — 97140 MANUAL THERAPY 1/> REGIONS: CPT | Performed by: PHYSICAL THERAPIST

## 2019-05-07 PROCEDURE — 97110 THERAPEUTIC EXERCISES: CPT | Performed by: PHYSICAL THERAPIST

## 2019-05-07 NOTE — PROGRESS NOTES
Physical Therapy Daily Progress Note        Subjective     Maryana Harness reports: I have had bronchitis past 2 weeks. Didn't do exercises. Still having some L anterior rib pain under L breast. I did feel better for a while after IE.    Objective   See Exercise, Manual, and Modality Logs for complete treatment.       Assessment/Plan  God result with manual techniques today. Decreased pain after treatment. Needed some cueing for thoracic wall stretch    Progress per Plan of Care           Manual Therapy:  30       mins  30909;  Therapeutic Exercise: 10      mins  46998;     Neuromuscular Demetria:       mins  60555;    Therapeutic Activity:         mins  90017;     Gait Training:         mins  36584;     Ultrasound:         mins  22780;    Electrical Stimulation:        mins  79907 ( );  Dry Needling         mins self-pay    Timed Treatment:   40   mins   Total Treatment:     40   mins    Calli Buitrago PT  Physical Therapist  [place] License # [id number]

## 2019-05-20 ENCOUNTER — TREATMENT (OUTPATIENT)
Dept: PHYSICAL THERAPY | Facility: CLINIC | Age: 49
End: 2019-05-20

## 2019-05-20 DIAGNOSIS — M54.2 MUSCULOSKELETAL NECK PAIN: Primary | ICD-10-CM

## 2019-05-20 DIAGNOSIS — M54.6 PAIN OF THORACIC FACET JOINT: ICD-10-CM

## 2019-05-20 PROCEDURE — 97140 MANUAL THERAPY 1/> REGIONS: CPT | Performed by: PHYSICAL THERAPIST

## 2019-05-20 PROCEDURE — 97110 THERAPEUTIC EXERCISES: CPT | Performed by: PHYSICAL THERAPIST

## 2019-05-20 NOTE — PROGRESS NOTES
Physical Therapy Daily Progress Note        Subjective     Maryana Harness reports: Neck feeling some better. R medial elbow pain and tenderness recently. I think I just do way too much sitting at computer and typing    Objective   See Exercise, Manual, and Modality Logs for complete treatment.       Assessment/Plan  Needs core strength and continued postural training and scap stab. Elbow felt much better after distraction and ice    Progress per Plan of Care           Manual Therapy:  25       mins  46108;  Therapeutic Exercise: 10      mins  79009;     Neuromuscular Demetria:       mins  19607;    Therapeutic Activity:         mins  46957;     Gait Training:         mins  42715;     Ultrasound:         mins  76814;    Electrical Stimulation:        mins  88707 ( );  Dry Needling         mins self-pay    Timed Treatment:   35   mins   Total Treatment:     35   mins    Calli Buitrago PT  Physical Therapist  [place] License # [id number]

## 2019-05-28 ENCOUNTER — TREATMENT (OUTPATIENT)
Dept: PHYSICAL THERAPY | Facility: CLINIC | Age: 49
End: 2019-05-28

## 2019-05-28 DIAGNOSIS — M54.2 MUSCULOSKELETAL NECK PAIN: Primary | ICD-10-CM

## 2019-05-28 DIAGNOSIS — M54.6 PAIN OF THORACIC FACET JOINT: ICD-10-CM

## 2019-05-28 PROCEDURE — 97140 MANUAL THERAPY 1/> REGIONS: CPT | Performed by: PHYSICAL THERAPIST

## 2019-05-28 PROCEDURE — 97110 THERAPEUTIC EXERCISES: CPT | Performed by: PHYSICAL THERAPIST

## 2019-05-28 NOTE — PROGRESS NOTES
Physical Therapy Daily Progress Note        Subjective     Maryana Harness reports: Tight L neck but not having thoracic and rib pain like initially    Objective   See Exercise, Manual, and Modality Logs for complete treatment.       Assessment/Plan  Needed reminders for  Trap stretch and pec stretch in corner. Added Tband ER/retraction    Progress per Plan of Care           Manual Therapy:  20       mins  16226;  Therapeutic Exercise: 20      mins  67328;     Neuromuscular Demetria:       mins  23674;    Therapeutic Activity:         mins  75340;     Gait Training:         mins  53929;     Ultrasound:         mins  13339;    Electrical Stimulation:        mins  21928 ( );  Dry Needling         mins self-pay    Timed Treatment:   40   mins   Total Treatment:     40   mins    Calli Buitrago, PT  Physical Therapist  [place] License # [id number]

## 2019-06-03 ENCOUNTER — TREATMENT (OUTPATIENT)
Dept: PHYSICAL THERAPY | Facility: CLINIC | Age: 49
End: 2019-06-03

## 2019-06-03 DIAGNOSIS — M54.2 MUSCULOSKELETAL NECK PAIN: Primary | ICD-10-CM

## 2019-06-03 PROCEDURE — 97140 MANUAL THERAPY 1/> REGIONS: CPT | Performed by: PHYSICAL THERAPIST

## 2019-06-04 NOTE — PROGRESS NOTES
Physical Therapy DIScharge  Note        Subjective     Maryana Harness reports: I am feeling so much better. Neck tired at end of day but not painful    Objective   5/5 UE strength B  See Exercise, Manual, and Modality Logs for complete treatment.       Assessment/Plan  Goals met    Other   DC PT           Manual Therapy:  20       mins  59178;  Therapeutic Exercise: 5      mins  79810; (review HEP)    Neuromuscular Demetria:       mins  50588;    Therapeutic Activity:         mins  36821;     Gait Training:         mins  08973;     Ultrasound:         mins  75828;    Electrical Stimulation:        mins  49794 ( );  Dry Needling         mins self-pay    Timed Treatment:   25   mins   Total Treatment:     25   mins    Calli Buitrago, PT  Physical Therapist  [place] License # [id number]

## 2019-12-03 ENCOUNTER — TRANSCRIBE ORDERS (OUTPATIENT)
Dept: ADMINISTRATIVE | Facility: HOSPITAL | Age: 49
End: 2019-12-03

## 2019-12-03 DIAGNOSIS — N63.0 BREAST LUMP: ICD-10-CM

## 2019-12-03 DIAGNOSIS — Z12.31 VISIT FOR SCREENING MAMMOGRAM: Primary | ICD-10-CM

## 2019-12-05 ENCOUNTER — TRANSCRIBE ORDERS (OUTPATIENT)
Dept: ADMINISTRATIVE | Facility: HOSPITAL | Age: 49
End: 2019-12-05

## 2019-12-05 DIAGNOSIS — N63.0 BREAST LUMP: Primary | ICD-10-CM

## 2019-12-05 DIAGNOSIS — Z12.31 VISIT FOR SCREENING MAMMOGRAM: Primary | ICD-10-CM

## 2019-12-17 ENCOUNTER — HOSPITAL ENCOUNTER (OUTPATIENT)
Dept: MAMMOGRAPHY | Facility: HOSPITAL | Age: 49
Discharge: HOME | End: 2019-12-17
Admitting: OBSTETRICS & GYNECOLOGY

## 2019-12-17 ENCOUNTER — HOSPITAL ENCOUNTER (OUTPATIENT)
Dept: ULTRASOUND IMAGING | Facility: HOSPITAL | Age: 49
Discharge: HOME | End: 2019-12-17

## 2019-12-17 DIAGNOSIS — N63.20 LEFT BREAST MASS: ICD-10-CM

## 2019-12-17 DIAGNOSIS — N63.0 BREAST LUMP: ICD-10-CM

## 2019-12-17 PROCEDURE — 77066 DX MAMMO INCL CAD BI: CPT

## 2019-12-17 PROCEDURE — 76642 ULTRASOUND BREAST LIMITED: CPT

## 2021-01-15 ENCOUNTER — IMMUNIZATION (OUTPATIENT)
Dept: VACCINE CLINIC | Facility: HOSPITAL | Age: 51
End: 2021-01-15

## 2021-01-15 PROCEDURE — 0001A: CPT | Performed by: INTERNAL MEDICINE

## 2021-01-15 PROCEDURE — 91300 HC SARSCOV02 VAC 30MCG/0.3ML IM: CPT | Performed by: INTERNAL MEDICINE

## 2021-01-15 PROCEDURE — 0002A: CPT | Performed by: INTERNAL MEDICINE

## 2021-02-05 ENCOUNTER — IMMUNIZATION (OUTPATIENT)
Dept: VACCINE CLINIC | Facility: HOSPITAL | Age: 51
End: 2021-02-05

## 2021-02-05 PROCEDURE — 91300 HC SARSCOV02 VAC 30MCG/0.3ML IM: CPT | Performed by: INTERNAL MEDICINE

## 2021-02-05 PROCEDURE — 0002A: CPT | Performed by: INTERNAL MEDICINE

## 2021-07-13 ENCOUNTER — TRANSCRIBE ORDERS (OUTPATIENT)
Dept: ADMINISTRATIVE | Facility: HOSPITAL | Age: 51
End: 2021-07-13

## 2021-07-13 DIAGNOSIS — Z12.31 VISIT FOR SCREENING MAMMOGRAM: Primary | ICD-10-CM

## 2021-08-31 ENCOUNTER — NURSE TRIAGE (OUTPATIENT)
Dept: CALL CENTER | Facility: HOSPITAL | Age: 51
End: 2021-08-31

## 2021-08-31 NOTE — TELEPHONE ENCOUNTER
"Call transferred from the HUB.  Caller is wanting an appt for a second opinion and possibly to change PCP's.  When asked her symptoms and the reason for an appt she stated that she had some circulation issues and at times has tingling and numbness in her hands and feet.  The HUB would not make her an appt with these symptoms without a triage from the nurse call center.  Patient states that she is going through menopause and she is just having multiple symptoms.  She has no symptoms that would warrant an evaluation today and she is fine to be given an appt.  Reason for Disposition  • Requesting regular office appointment    Additional Information  • Negative: [1] Caller is not with the adult (patient) AND [2] reporting urgent symptoms  • Negative: Lab result questions  • Negative: Medication questions  • Negative: Caller can't be reached by phone  • Negative: Caller has already spoken to PCP or another triager  • Negative: RN needs further essential information from caller in order to complete triage  • Negative: [1] Caller requesting NON-URGENT health information AND [2] PCP's office is the best resource  • Negative: Health Information question, no triage required and triager able to answer question  • Negative: General information question, no triage required and triager able to answer question    Answer Assessment - Initial Assessment Questions  1. REASON FOR CALL or QUESTION: \"What is your reason for calling today?\" or \"How can I best help you?\" or \"What question do you have that I can help answer?\"      I just need to make an appointment for a second opinion and possibly change PCP's.    Protocols used: INFORMATION ONLY CALL - NO TRIAGE-ADULT-    "

## 2021-10-15 ENCOUNTER — HOSPITAL ENCOUNTER (OUTPATIENT)
Dept: MAMMOGRAPHY | Facility: HOSPITAL | Age: 51
Discharge: HOME | End: 2021-10-15
Admitting: OBSTETRICS & GYNECOLOGY

## 2021-10-15 DIAGNOSIS — Z12.31 VISIT FOR SCREENING MAMMOGRAM: ICD-10-CM

## 2021-10-15 PROCEDURE — 77067 SCR MAMMO BI INCL CAD: CPT

## 2021-10-15 PROCEDURE — 77063 BREAST TOMOSYNTHESIS BI: CPT

## 2021-10-21 ENCOUNTER — TRANSCRIBE ORDERS (OUTPATIENT)
Dept: ADMINISTRATIVE | Facility: HOSPITAL | Age: 51
End: 2021-10-21

## 2021-10-21 DIAGNOSIS — N64.89 BREAST ASYMMETRY: Primary | ICD-10-CM

## 2021-11-29 ENCOUNTER — HOSPITAL ENCOUNTER (OUTPATIENT)
Dept: ULTRASOUND IMAGING | Facility: HOSPITAL | Age: 51
Discharge: HOME | End: 2021-11-29

## 2021-11-29 ENCOUNTER — HOSPITAL ENCOUNTER (OUTPATIENT)
Dept: MAMMOGRAPHY | Facility: HOSPITAL | Age: 51
Discharge: HOME | End: 2021-11-29

## 2021-11-29 DIAGNOSIS — N64.89 BREAST ASYMMETRY: ICD-10-CM

## 2021-11-29 PROCEDURE — 77065 DX MAMMO INCL CAD UNI: CPT

## 2021-11-29 PROCEDURE — 76642 ULTRASOUND BREAST LIMITED: CPT

## 2021-11-29 PROCEDURE — G0279 TOMOSYNTHESIS, MAMMO: HCPCS

## 2021-12-11 ENCOUNTER — IMMUNIZATION (OUTPATIENT)
Dept: VACCINE CLINIC | Facility: HOSPITAL | Age: 51
End: 2021-12-11

## 2021-12-11 ENCOUNTER — APPOINTMENT (OUTPATIENT)
Dept: VACCINE CLINIC | Facility: HOSPITAL | Age: 51
End: 2021-12-11

## 2021-12-11 PROCEDURE — 0004A HC ADM SARSCOV2 30MCG/0.3ML BOOSTER: CPT | Performed by: INTERNAL MEDICINE

## 2021-12-11 PROCEDURE — 91300 HC SARSCOV02 VAC 30MCG/0.3ML IM: CPT | Performed by: INTERNAL MEDICINE

## 2022-02-02 ENCOUNTER — OFFICE VISIT (OUTPATIENT)
Dept: SURGERY | Facility: CLINIC | Age: 52
End: 2022-02-02

## 2022-02-02 VITALS
BODY MASS INDEX: 30.57 KG/M2 | HEIGHT: 66 IN | TEMPERATURE: 98.1 F | OXYGEN SATURATION: 95 % | WEIGHT: 190.2 LBS | DIASTOLIC BLOOD PRESSURE: 76 MMHG | SYSTOLIC BLOOD PRESSURE: 112 MMHG | HEART RATE: 97 BPM

## 2022-02-02 DIAGNOSIS — K59.00 CONSTIPATION, UNSPECIFIED CONSTIPATION TYPE: ICD-10-CM

## 2022-02-02 DIAGNOSIS — K64.3 PROLAPSED INTERNAL HEMORRHOIDS, GRADE 4: Primary | ICD-10-CM

## 2022-02-02 DIAGNOSIS — Z86.010 HISTORY OF COLON POLYPS: ICD-10-CM

## 2022-02-02 PROBLEM — Z12.11 ENCOUNTER FOR SCREENING COLONOSCOPY: Status: ACTIVE | Noted: 2022-02-02

## 2022-02-02 PROCEDURE — 99204 OFFICE O/P NEW MOD 45 MIN: CPT | Performed by: PHYSICIAN ASSISTANT

## 2022-02-02 RX ORDER — HYDROCORTISONE 25 MG/G
CREAM TOPICAL
Qty: 30 G | Refills: 1 | Status: SHIPPED | OUTPATIENT
Start: 2022-02-02 | End: 2022-03-14 | Stop reason: SDUPTHER

## 2022-02-02 RX ORDER — FLUOCINONIDE TOPICAL SOLUTION USP, 0.05% 0.5 MG/ML
SOLUTION TOPICAL
COMMUNITY
Start: 2021-11-01 | End: 2022-02-02

## 2022-02-02 RX ORDER — METRONIDAZOLE 10 MG/G
GEL TOPICAL
COMMUNITY
Start: 2021-12-26 | End: 2022-02-02

## 2022-02-02 RX ORDER — VALSARTAN 160 MG/1
160 TABLET ORAL DAILY
COMMUNITY
Start: 2022-01-31

## 2022-02-02 RX ORDER — ESOMEPRAZOLE MAGNESIUM 40 MG/1
40 CAPSULE, DELAYED RELEASE ORAL DAILY
COMMUNITY
Start: 2022-01-31

## 2022-02-02 RX ORDER — FLUTICASONE PROPIONATE 50 MCG
SPRAY, SUSPENSION (ML) NASAL
COMMUNITY
Start: 2021-12-27 | End: 2022-08-05 | Stop reason: SDUPTHER

## 2022-02-02 RX ORDER — PRUCALOPRIDE 1 MG/1
1 TABLET, FILM COATED ORAL DAILY
Qty: 30 TABLET | Refills: 1 | Status: SHIPPED | OUTPATIENT
Start: 2022-02-02 | End: 2022-03-14 | Stop reason: SDUPTHER

## 2022-02-02 RX ORDER — SODIUM FLUORIDE1.1%, POTASSIUM NITRATE 5% 5.8; 57.5 MG/ML; MG/ML
GEL, DENTIFRICE DENTAL
COMMUNITY
Start: 2021-12-26 | End: 2022-11-02

## 2022-02-02 RX ORDER — IVERMECTIN 10 MG/G
CREAM TOPICAL
COMMUNITY
Start: 2022-01-25

## 2022-02-02 RX ORDER — NAPROXEN SODIUM 220 MG
220 TABLET ORAL 2 TIMES DAILY PRN
COMMUNITY
End: 2022-11-01

## 2022-02-02 RX ORDER — SUMATRIPTAN 50 MG/1
TABLET, FILM COATED ORAL
COMMUNITY
Start: 2021-12-27 | End: 2022-11-02

## 2022-02-02 RX ORDER — CLOBETASOL PROPIONATE 0.46 MG/ML
SOLUTION TOPICAL
COMMUNITY
Start: 2022-01-25

## 2022-02-02 RX ORDER — SODIUM CHLORIDE, SODIUM LACTATE, POTASSIUM CHLORIDE, CALCIUM CHLORIDE 600; 310; 30; 20 MG/100ML; MG/100ML; MG/100ML; MG/100ML
30 INJECTION, SOLUTION INTRAVENOUS CONTINUOUS
Status: CANCELLED | OUTPATIENT
Start: 2022-04-27

## 2022-02-02 RX ORDER — KETOCONAZOLE 20 MG/ML
SHAMPOO TOPICAL
COMMUNITY
Start: 2022-01-20 | End: 2022-02-02

## 2022-02-02 NOTE — PROGRESS NOTES
"Maryana Huber is a 51 y.o. female who is seen as a consult at the request of Self Referring for Consult, Hemorrhoids, Abdominal Pain, and Rectal Pain.      HPI:  Complains of painful hemorrhoids  Usually she can shrink her hemorrhoids with Preparation H, but not this time  Has been sitting for long periods of time for work  One anal area feels harder to the touch; she used to be able to push it in, but now it stays out.   Painful to sit; called in sick today because she couldn't sit and work all day.    Also complains of constipation  Takes linzess q3d; no BM for a week if doesn't take Linzess   Has associated RUQ abdominal discomfort right before bm.  After taking linzess, she has watery diarrhea for 2-3hrs  Symptoms have been this bad for a couple of weeks, after a particularly severe bout of diarrhea after taking Linzess.  Had small amount of bleeding after that episode; doesn't usually have bleeding.     Was born with \"small rectum\"  Tried fiber in past--bulked up stool too much. Hasn't tried it in 2 years.  No relief with Miralax, but it has been at least a year since she tried that.    No family history of colon cancer or colon polyps in primary relatives.    Per pt, has been at least 8 yrs since colonoscopy with Dr. Owen; Dr. Rees's prior note says Dr. Owen performed a colonoscopy in 12/2011.      Past Medical History:   Diagnosis Date   • Abdominal cramping 07/17/2017    SEEN AT [facility]   • Anemia    • Anxiety    • Arthralgia of left temporomandibular joint 06/2021   • Atypical chest pain 08/30/2015    SEEN AT [facility]   • Breast asymmetry    • Carpal tunnel syndrome, bilateral    • Cervical ca (HCC) 2010    S/P LEEP   • Chest pain on breathing 01/2019   • Chronic neck pain    • Constipation    • Cystitis 03/2019   • Depression    • Dysuria    • Fibrocystic breast    • GERD (gastroesophageal reflux disease)    • Hemorrhoids    • Hypertension    • Hypoglycemia 07/2012   • IBS (irritable bowel syndrome)    • " Influenza 02/12/2018   • Left breast mass 12/17/2019   • Left-sided chest pain 12/2019   • Migraine    • Nodule of anus 10/2018   • Occipital headache 11/24/2017   • Pelvic pain    • Periodic headache syndrome 10/2019   • Right ankle sprain 08/30/2011    SEEN AT [facility]   • Rosacea    • Urgency of micturition    • Vitamin B 12 deficiency        Past Surgical History:   Procedure Laterality Date   • APPENDECTOMY N/A    • BREAST BIOPSY     • BREAST LUMPECTOMY      BENIGN   • COLONOSCOPY N/A 12/19/2011    ENTIRE COLON WNL, RESCOPE IN 5 YRS, DR. JAMAR BELL AT [facility]   • ENDOSCOPY N/A 06/14/2011    NORMAL ESOPHAGUS, NORMAL DUODENUM, A FEW BENIGN GASTRIC POLYPS, DR. DAVID SANCEHZ AT [facility]   • FACIAL LACERATIONS REPAIR Right 07/06/2011    RIGHT EYEBROWN, DONE AT [facility]   • LEEP N/A 08/16/2010    FOR SEVERE DYSPLASIA OF CERVIX, LARGE AREA OF NON UPTAKE IN THE POSTERIOR LIP OF THE CERVIX, AND A SMALL AREA ON THE ANTERIOR LIP OF THE CERVIX, DR. KAYLEIGH GHOTRA AT [facility]   • VAGINAL DELIVERY N/A 02/01/2001    DR. YESSENIA SHINE       Social History:   reports that she has never smoked. She has never used smokeless tobacco. She reports that she does not drink alcohol and does not use drugs.      Marriage status:     Family History   Problem Relation Age of Onset   • No Known Problems Son    • Cancer Maternal Grandmother    • Cancer Maternal Grandfather    • Colon polyps Mother    • Colon polyps Father          Current Outpatient Medications:   •  Acetaminophen (TYLENOL 8 HOUR PO), Take  by mouth., Disp: , Rfl:   •  clobetasol (TEMOVATE) 0.05 % external solution, , Disp: , Rfl:   •  esomeprazole (nexIUM) 40 MG capsule, , Disp: , Rfl:   •  fluticasone (FLONASE) 50 MCG/ACT nasal spray, , Disp: , Rfl:   •  naproxen sodium (ALEVE) 220 MG tablet, Take 220 mg by mouth 2 (Two) Times a Day As Needed., Disp: , Rfl:   •  Sodium Fluoride 5000 Sensitive 1.1-5 % gel, , Disp: , Rfl:   •  Soolantra 1 % cream, , Disp: , Rfl:   •  SUMAtriptan  (IMITREX) 50 MG tablet, , Disp: , Rfl:   •  valsartan (DIOVAN) 160 MG tablet, , Disp: , Rfl:   •  Hydrocortisone, Perianal, (Anusol-HC) 2.5 % rectal cream, Apply rectally 3 times daily.  Include applicator.  Do not use for more than 7 days at a time., Disp: 30 g, Rfl: 1  •  Prucalopride Succinate (Motegrity) 1 MG tablet, Take 1 tablet by mouth Daily., Disp: 30 tablet, Rfl: 1    Allergy  Clarithromycin, Gatifloxacin, Levofloxacin, Nitrofurantoin monohyd macro, Trazodone and nefazodone, Desloratadine, Hydrocodone, and Tetracycline hcl    Review of Systems   Constitutional: Negative for decreased appetite and weight gain.   HENT: Negative for congestion, hearing loss and hoarse voice.    Eyes: Negative for blurred vision, discharge and visual disturbance.   Cardiovascular: Negative for chest pain, cyanosis and leg swelling.   Respiratory: Negative for cough, shortness of breath, sleep disturbances due to breathing and snoring.    Endocrine: Negative for cold intolerance and heat intolerance.   Hematologic/Lymphatic: Does not bruise/bleed easily.   Skin: Negative for itching, poor wound healing and skin cancer.   Musculoskeletal: Positive for arthritis. Negative for back pain, joint pain and joint swelling.   Gastrointestinal: Positive for abdominal pain and hemorrhoids. Negative for change in bowel habit, bowel incontinence and constipation.   Genitourinary: Negative for bladder incontinence, dysuria and hematuria.   Neurological: Positive for headaches. Negative for brief paralysis, excessive daytime sleepiness, dizziness, focal weakness, light-headedness and weakness.   Psychiatric/Behavioral: Negative for altered mental status and hallucinations. The patient does not have insomnia.    Allergic/Immunologic: Negative for HIV exposure and persistent infections.   All other systems reviewed and are negative.      Vitals:    02/02/22 1547   BP: 112/76   Pulse: 97   Temp: 98.1 °F (36.7 °C)   SpO2: 95%     Body mass index  is 30.7 kg/m².    Physical Exam  Vitals reviewed. Exam conducted with a chaperone present.   Constitutional:       General: She is not in acute distress.     Appearance: Normal appearance.   HENT:      Head: Normocephalic and atraumatic.   Eyes:      Extraocular Movements: Extraocular movements intact.   Cardiovascular:      Rate and Rhythm: Normal rate.   Pulmonary:      Effort: Pulmonary effort is normal.   Genitourinary:     Comments: Perianal exam: Grade 4 prolapsing internal hemorrhoid right posterior, no fissure  KAYLIE-moderate tone, no masses  Anoscopy performed:  Grade 1 right anterior and left lateral internal hemorrhoids  Musculoskeletal:         General: Normal range of motion.      Cervical back: Normal range of motion.   Skin:     General: Skin is warm and dry.   Neurological:      General: No focal deficit present.      Mental Status: She is alert.   Psychiatric:         Mood and Affect: Mood normal.         Behavior: Behavior normal.         Review of Medical Record:  I reviewed Dr. Rees's note from October 2018, which reports that the most recent colonoscopy was performed in 12/2011 by Dr. Owen--no polyps.  Patient with history of abnormal Pap.  LEEP 2010.  AKIRA-3 per patient.  HPV positive per patient, unknown strains.  Takes Linzess.  Dr. Rees recommended colonoscopy at that time, but there is no report of colonoscopy in the EMR.  Patient declined excision of anal nodule (consistent with resolving thrombosed hemorrhoid or cyst on physical exam) at that time.  She was advised to use [facility].      Assessment:  1. Prolapsed internal hemorrhoids, grade 4    2. History of colon polyps    3. Constipation, unspecified constipation type        Plan:  Recommend colonoscopy within a month or so. Will schedule.    Anusol cream prescription sent.     Recommend OTC lidocaine ointment for pain relief.     Prescription for Motegrity sent. Call if too expensive; can try alternative.    Recommend fiber  "supplement: 30-40g total fiber/day, titrate up to desired stool consistency.    Recommend increase water intake and Miralax daily    Can also try milk of mg or mg citrate for constipation.    Notified Dr. Rees that pt requests smaller endoscope due to being born with \"small rectum.\"    Consider sitz marker study in future if no relief with Motegrity.     RTC 4-6 weeks for reevaluation.     Rina Martell PA-C  Physician Assistant  Colorectal and General Surgery  2/3/2022  15:11 EST        "

## 2022-02-02 NOTE — PROGRESS NOTES
Last seen October 2018 Cab[facility].  That note says most recent colonoscopy was 12/2011 with Dr. Owen no polyps.  Patient with history of abnormal Pap.  LEEP 2010.  AKIRA-3 per patient.  HPV positive per patient, unknown strains.  Takes Donny.  Cabrera recommended colonoscopy at that time, but there is no report of colonoscopy in the EMR.  Patient declined excision of anal nodule (consistent with resolving thrombosed hemorrhoid or cyst on physical exam) at that time.  She was advised to use [facility].    Reason for visit: Uncomfortable to sit, sees more hemorrhoids

## 2022-02-07 ENCOUNTER — TELEPHONE (OUTPATIENT)
Dept: SURGERY | Facility: CLINIC | Age: 52
End: 2022-02-07

## 2022-02-07 NOTE — TELEPHONE ENCOUNTER
Message sent to pt via [employer].    Thank you.    ----- Message from Rina Martell PA-C sent at 2/3/2022  3:08 PM EST -----  Girish Alan,   Could you please call this pt or send a message in [employer] advising her that she could also try Milk of Magnesia or Magnesium citrate for her constipation? Thank you.

## 2022-02-19 ENCOUNTER — TELEMEDICINE (OUTPATIENT)
Dept: FAMILY MEDICINE CLINIC | Facility: TELEHEALTH | Age: 52
End: 2022-02-19

## 2022-02-19 DIAGNOSIS — B00.9 HSV-1 (HERPES SIMPLEX VIRUS 1) INFECTION: Primary | ICD-10-CM

## 2022-02-19 PROBLEM — F41.9 CHRONIC ANXIETY: Status: RESOLVED | Noted: 2018-10-02 | Resolved: 2022-02-19

## 2022-02-19 PROCEDURE — 99213 OFFICE O/P EST LOW 20 MIN: CPT | Performed by: NURSE PRACTITIONER

## 2022-02-19 RX ORDER — VALACYCLOVIR HYDROCHLORIDE 1 G/1
TABLET, FILM COATED ORAL
Qty: 20 TABLET | Refills: 1 | Status: SHIPPED | OUTPATIENT
Start: 2022-02-19 | End: 2023-01-24 | Stop reason: SDUPTHER

## 2022-02-19 RX ORDER — VALACYCLOVIR HYDROCHLORIDE 1 G/1
TABLET, FILM COATED ORAL
Qty: 4 TABLET | Refills: 1 | Status: SHIPPED | OUTPATIENT
Start: 2022-02-19 | End: 2022-02-19 | Stop reason: ALTCHOICE

## 2022-02-19 NOTE — PROGRESS NOTES
Mode of Visit: Video  Location of patient: home  You have chosen to receive care through a telehealth visit.  The patient has signed the video visit consent form.  The visit included audio and video interaction. No technical issues occurred during this visit.   Video Visit Reason:   Free Text Description: I have several (what I assume) are apthous ulcers in my mouth and on my tongue causing throat inflammation and headache. I also have more joint pain when I break out with these, which is often.  Chief Complaint  Mouth Lesions    Subjective          Maryana Huber presents to [facility]  Multiple ulcerations in mouth, on tongue, and inner lip with headache and tender neck nodes. Frequent occurrence of lesions and has talked to PCP about the lesions. She has joint pain due to arthritis and states that when she has an outbreak, the joint pain worsens. She has been on B12. She takes Aleve for her arthritis. She reports that she has had antiviral medication in the past and it did help with the discomfort. She is miserable today and can feel more lesions developing.     Mouth Lesions   The current episode started 3 to 5 days ago. The onset was sudden. The problem occurs frequently. The problem has been gradually worsening. The problem is moderate. Nothing relieves the symptoms. The symptoms are aggravated by eating and drinking. Associated symptoms include mouth sores, swollen glands and muscle aches. Pertinent negatives include no fever, no nausea, no neck pain and no neck stiffness. Recently, medical care has been given by the PCP.     Objective   Vital Signs:   There were no vitals taken for this visit.    Physical Exam   HENT:   Mouth/Throat: Oral lesions (multiple aphthous ulcers on tongue and gum, inner lip) present.     Result Review :                 Assessment and Plan {CC Problem List  Visit Diagnosis   ROS  Review (Popup)  Health Maintenance  Quality  BestPractice   Medications  SmartSets  SnapShot Encounters  Media :23}   Diagnoses and all orders for this visit:    1. HSV-1 (herpes simplex virus 1) infection (Primary)  -     Discontinue: valACYclovir (Valtrex) 1000 MG tablet; Take 2 tabs every 12 hours x 2 doses  Dispense: 4 tablet; Refill: 1  -     valACYclovir (Valtrex) 1000 MG tablet; Take 1 tabs every 12 hours x 10 day  Dispense: 20 tablet; Refill: 1     Consider multi-vitamin and Super B Complex.  Discuss with PCP if helpful. Many HSV oral lesions involve cutaneous lip and not oral cavity, thus this may not be helpful. It can also present in more complicated cases and raise suspicion for underlying disease such as immunosuppression. Continue to follow up with PCP as scheduled.          I spent 20 minutes caring for Maryana on this date of service. This time includes time spent by me in the following activities:preparing for the visit, obtaining and/or reviewing a separately obtained history, performing a medically appropriate examination and/or evaluation , counseling and educating the patient/family/caregiver, ordering medications, tests, or procedures, and documenting information in the medical record  Follow Up   No follow-ups on file.  Patient was given instructions and counseling regarding her condition or for health maintenance advice. Please see specific information pulled into the AVS if appropriate.

## 2022-02-19 NOTE — PATIENT INSTRUCTIONS
Consider multi-vitamin and Super B Complex.  Discuss with PCP if helpful. Many HSV oral lesions involve cutaneous lip and not oral cavity, thus this may not be helpful. It can also present in more complicated cases and raise suspicion for underlying disease such as immunosuppression. Continue to follow up with PCP as scheduled.    Cold Sore    A cold sore, also called a fever blister, is a small, fluid-filled sore that forms inside of the mouth or on the lips, gums, nose, chin, or cheeks. Cold sores can spread to other parts of the body, such as the eyes or fingers.  Cold sores can spread from person to person (are contagious) until the sores crust over completely. Most cold sores go away within 2 weeks.  What are the causes?  Cold sores are caused by a virus (herpes simplex virus type 1, HSV-1). The virus can spread from person to person through close contact, such as through:  · Kissing.  · Touching the affected area.  · Sharing personal items such as lip balm, razors, a drinking glass, or eating utensils.  What increases the risk?  You are more likely to develop this condition if you:  · Are tired, stressed, or sick.  · Are having your period (menstruating).  · Are pregnant.  · Take certain medicines.  · Are out in cold weather or get too much sun.  What are the signs or symptoms?  Symptoms of a cold sore outbreak go through different stages. These are the stages of a cold sore:  · Tingling, itching, or burning is felt 1-2 days before the outbreak.  · Fluid-filled blisters appear on the lips, inside the mouth, on the nose, or on the cheeks.  · The blisters start to ooze clear fluid.  · The blisters dry up, and a yellow crust appears in their place.  · The crust falls off.  In some cases, other symptoms can develop during a cold sore outbreak. These can include:  · Fever.  · Sore throat.  · Headache.  · Muscle aches.  · Swollen neck glands.  How is this treated?  There is no cure for cold sores or the virus that  causes them. There is also no vaccine to prevent the virus. Most cold sores go away on their own without treatment within 2 weeks. Your doctor may prescribe medicines to:  · Help with pain.  · Keep the virus from growing.  · Help you heal faster.  Medicines may be in the form of creams, gels, pills, or a shot.  Follow these instructions at home:  Medicines  · Take or apply over-the-counter and prescription medicines only as told by your doctor.  · Use a cotton-tip swab to apply creams or gels to your sores.  · Ask your doctor if you can take lysine supplements. These may help with healing.  Sore care    · Do not touch the sores or pick the scabs.  · Wash your hands often. Do not touch your eyes without washing your hands first.  · Keep the sores clean and dry.  · If told, put ice on the sores:  ? Put ice in a plastic bag.  ? Place a towel between your skin and the bag.  ? Leave the ice on for 20 minutes, 2-3 times a day.    Eating and drinking  · Eat a soft, bland diet. Avoid eating hot, cold, or salty foods. These can hurt your mouth.  · Use a straw if it hurts to drink out of a glass.  · Eat foods that have a lot of lysine in them. These include meat, fish, and dairy products.  · Avoid sugary foods, chocolates, nuts, and grains. These foods have a high amount of a substance (arginine) that can cause the virus to grow.  Lifestyle  · Do not kiss, have oral sex, or share personal items until your sores heal.  · Stress, poor sleep, and being out in the sun can trigger outbreaks. Make sure you:  ? Do activities that help you relax, such as deep breathing exercises or meditation.  ? Get enough sleep.  ? Apply sunscreen on your lips before you go out in the sun.  Contact a doctor if:  · You have symptoms for more than 2 weeks.  · You have pus coming from the sores.  · You have redness that is spreading.  · You have pain or irritation in your eye.  · You get sores on your genitals.  · Your sores do not heal within 2  weeks.  · You get cold sores often.  Get help right away if:  · You have a fever and your symptoms suddenly get worse.  · You have a headache and confusion.  · You have tiredness (fatigue).  · You do not want to eat as much as normal (loss of appetite).  · You have a stiff neck or are sensitive to light.  Summary  · A cold sore is a small, fluid-filled sore that forms inside of the mouth or on the lips, gums, nose, chin, or cheeks.  · Cold sores can spread from person to person (are contagious) until the sores crust over completely. Most cold sores go away within 2 weeks.  · Wash your hands often. Do not touch your eyes without washing your hands first.  · Do not kiss, have oral sex, or share personal items until your sores heal.  · Contact a doctor if your sores do not heal within 2 weeks.  This information is not intended to replace advice given to you by your health care provider. Make sure you discuss any questions you have with your health care provider.  Document Revised: 04/08/2020 Document Reviewed: 05/20/2019  Elsevier Patient Education © 2021 [employer]

## 2022-03-07 ENCOUNTER — TELEPHONE (OUTPATIENT)
Dept: GASTROENTEROLOGY | Facility: CLINIC | Age: 52
End: 2022-03-07

## 2022-03-07 ENCOUNTER — OFFICE VISIT (OUTPATIENT)
Dept: GASTROENTEROLOGY | Facility: CLINIC | Age: 52
End: 2022-03-07

## 2022-03-07 VITALS — TEMPERATURE: 97.5 F | WEIGHT: 191 LBS | HEIGHT: 66 IN | BODY MASS INDEX: 30.7 KG/M2

## 2022-03-07 DIAGNOSIS — R14.0 ABDOMINAL BLOATING: Primary | ICD-10-CM

## 2022-03-07 PROCEDURE — 99203 OFFICE O/P NEW LOW 30 MIN: CPT | Performed by: INTERNAL MEDICINE

## 2022-03-07 RX ORDER — LUBIPROSTONE 8 UG/1
8 CAPSULE ORAL 2 TIMES DAILY WITH MEALS
Qty: 60 CAPSULE | Refills: 12 | Status: SHIPPED | OUTPATIENT
Start: 2022-03-07 | End: 2022-06-27

## 2022-03-07 NOTE — PROGRESS NOTES
Chief Complaint   Patient presents with   • Hemorrhoids   • Abdominal Pain     Subjective     HPI  Maryana Huber is a 52 y.o. female who presents today for new office visit  Chronic abdominal pain right side upper quadrant and lower quadrant  Chronic constipation Z 72 mcg causes diarrhea  She would like to try something different  She has never tried Amitiza  Last colonoscopy 2011  She is dealing with internal hemorrhoids using over-the-counter Preparation H    Objective   Vitals:    03/07/22 1549   Temp: 97.5 °F (36.4 °C)       Physical Exam  Vitals reviewed.   Constitutional:       Appearance: She is well-developed.   HENT:      Head: Normocephalic and atraumatic.   Abdominal:      General: Bowel sounds are normal. There is no distension.      Palpations: Abdomen is soft. There is no mass.      Tenderness: There is no abdominal tenderness.      Hernia: No hernia is present.   Skin:     General: Skin is warm and dry.   Neurological:      Mental Status: She is alert and oriented to person, place, and time.   Psychiatric:         Behavior: Behavior normal.         Thought Content: Thought content normal.         Judgment: Judgment normal.       The following data was reviewed by: Pb Pettit MD on 03/07/2022:  Common labs    Common Labsle 6/17/21 6/17/21 6/17/21    1643 1643 1643   Glucose   41 (A)   BUN   11   Creatinine   0.7   Sodium   139   Potassium   4.8   Chloride   103   Calcium   9.3   Albumin   4.7   Total Bilirubin   0.2   Alkaline Phosphatase   53   AST (SGOT)   21   ALT (SGPT)   9 (A)   WBC 5.47     Hemoglobin 11.3 (A)     Hematocrit 36.6     Platelets 302     Hemoglobin A1C  5.5    (A) Abnormal value       Comments are available for some flowsheets but are not being displayed.           CMP    CMP 6/17/21   Glucose 41 (A)   BUN 11   Creatinine 0.7   Sodium 139   Potassium 4.8   Chloride 103   Calcium 9.3   Albumin 4.7   Total Bilirubin 0.2   Alkaline Phosphatase 53   AST (SGOT) 21   ALT (SGPT) 9 (A)    (A) Abnormal value            CBC    CBC 6/17/21   WBC 5.47   RBC 3.95 (A)   Hemoglobin 11.3 (A)   Hematocrit 36.6   MCV 92.7   MCH 28.6   MCHC 30.9 (A)   RDW 13.1   Platelets 302   (A) Abnormal value            CBC w/diff    CBC w/Diff 6/17/21   WBC 5.47   RBC 3.95 (A)   Hemoglobin 11.3 (A)   Hematocrit 36.6   MCV 92.7   MCH 28.6   MCHC 30.9 (A)   RDW 13.1   Platelets 302   Neutrophil Rel % 55.8   Immature Granulocyte Rel % 0.2   Lymphocyte Rel % 34.0   Monocyte Rel % 8.0   Eosinophil Rel % 1.3   Basophil Rel % 0.7   (A) Abnormal value                TSH    TSH 6/17/21   TSH 3.000           c/s with Dr. Owen 2011 showed internal hemorrhoids otherwise negative    Assessment/Plan   Assessment:     Internal hemorrhoids  Chronic constipation  Right upper quadrant pain    Plan:   Check CAT scan of the abdomen and pelvis  Schedule colonoscopy for screening and constipation  Begin Amitiza 8 mcg p.o. twice daily  If CAT scan is unrevealing consider work-up for gallbladder    I spent 25 minutes caring for Maryana on this date of service. This time includes time spent by me in the following activities:preparing for the visit, reviewing tests, obtaining and/or reviewing a separately obtained history, performing a medically appropriate examination and/or evaluation , counseling and educating the patient/family/caregiver, ordering medications, tests, or procedures, referring and communicating with other health care professionals , documenting information in the medical record, independently interpreting results and communicating that information with the patient/family/caregiver and care coordination    Pb Pettit MD  [facility] ([facility]  [address] [place] [address]  Office: [phone]

## 2022-03-07 NOTE — TELEPHONE ENCOUNTER
LAMBERTO patient in office for colonoscopy. Scheduled 06/21/2022 with arrival time 9:30am. Prep packet handed to patient. Also advised arrival time may vary based on [facility] guidelines. LAMBERTO Banda--Miralax    DB- patient is requesting to have a smaller scope used as she has had pain/discomfort with previous scopes.

## 2022-03-09 ENCOUNTER — TELEPHONE (OUTPATIENT)
Dept: GASTROENTEROLOGY | Facility: CLINIC | Age: 52
End: 2022-03-09

## 2022-03-09 NOTE — TELEPHONE ENCOUNTER
PA in process thru [facility] for [facility]    Pa approved 3/9/2022-3/9/2023   To be scanned into Media upon receipt

## 2022-03-10 ENCOUNTER — TELEPHONE (OUTPATIENT)
Dept: GASTROENTEROLOGY | Facility: CLINIC | Age: 52
End: 2022-03-10

## 2022-03-10 NOTE — TELEPHONE ENCOUNTER
----- Message from Zahira Boston sent at 3/10/2022  2:16 PM EST -----  Regarding: lubiprostone (Amitiza) 8 MCG capsule [01600]  Contact: [phone]  PT called inquiring about an alternative for lubiprostone (Amitiza) 8 MCG capsule [01249] that is not linzess. PT would like a medication that is covered under her insurance if possible. PT would like to get an alternate before the weekend if possible. Please call: [phone] to discuss.

## 2022-03-11 NOTE — TELEPHONE ENCOUNTER
Per Pb Pettit MD Stowers, Sharon G, YOSVANY; Liza Lucio, RN; Myranda Schwab, RN  Caller: Unspecified (Yesterday,  3:06 PM)  Let her  know what is covered and I am happy to prescribe what ever she wants just let me know (they all work the same and are equally efficacious in trials)

## 2022-03-11 NOTE — TELEPHONE ENCOUNTER
Called pt, she states she will try the Amitiza and see if it works.  Advised to call back if any questions or concerns.

## 2022-03-14 ENCOUNTER — OFFICE VISIT (OUTPATIENT)
Dept: SURGERY | Facility: CLINIC | Age: 52
End: 2022-03-14

## 2022-03-14 VITALS
BODY MASS INDEX: 30.02 KG/M2 | SYSTOLIC BLOOD PRESSURE: 100 MMHG | OXYGEN SATURATION: 99 % | HEART RATE: 75 BPM | TEMPERATURE: 97.5 F | DIASTOLIC BLOOD PRESSURE: 80 MMHG | WEIGHT: 186.8 LBS | HEIGHT: 66 IN

## 2022-03-14 DIAGNOSIS — K64.8 INTERNAL HEMORRHOIDS WITH COMPLICATION: ICD-10-CM

## 2022-03-14 DIAGNOSIS — K59.00 CONSTIPATION, UNSPECIFIED CONSTIPATION TYPE: Primary | ICD-10-CM

## 2022-03-14 PROCEDURE — 99213 OFFICE O/P EST LOW 20 MIN: CPT | Performed by: COLON & RECTAL SURGERY

## 2022-03-14 RX ORDER — HYDROCORTISONE 25 MG/G
CREAM TOPICAL
Qty: 30 G | Refills: 1 | Status: SHIPPED | OUTPATIENT
Start: 2022-03-14 | End: 2022-06-27 | Stop reason: SDUPTHER

## 2022-03-15 NOTE — PROGRESS NOTES
"Maryana Huber is a 52 y.o. female in for follow up of constipation and hemorrhoids.      The patient was seen a month ago with enlarged hemorrhoids and constipation. She was given a prescription for Motegrity. The patient is taking Linzess every 3 days. She saw Dr. Aguirre in office on 03/07/2022 for the same complaints of hemorrhoids and abdominal pain. Dr. Lantigua ordered a CAT scan which has not been done yet. He also gave her a prescription for Amitiza twice a day. The patient reports that she has not tried the Motegrity yet. She states that she works full time during the week. She reports that she was going to try it this weekend in case it caused nausea. The patient reports that she increase her fiber and is alternating with Rebecca lax. She states that that this seems t be helping. The patient reports that she had several bowel movements on her own. She states not as much as she would have if she were taking the Linzess or Motegrity. She reports that using the hydrocortisone cream it has shrunk, she is able to lay down. She states that she was going to try the Amitiza. The patient  was told that there may be less side effects. She states that when she takes the Linzess, she will have a regular bowel movement and everything after that is diarrhea. She has not decided if she will keep the colonoscopy appointment, her last colonoscopy was in 2011.     /80 (BP Location: Left arm, Patient Position: Sitting, Cuff Size: Large Adult)   Pulse 75   Temp 97.5 °F (36.4 °C)   Ht 167.6 cm (66\")   Wt 84.7 kg (186 lb 12.8 oz)   LMP  (LMP Unknown)   SpO2 99%   Breastfeeding No   BMI 30.15 kg/m²   Body mass index is 30.15 kg/m².        PE:  Physical Exam  Constitutional:       General: She is not in acute distress.     Appearance: She is well-developed.   HENT:      Head: Normocephalic and atraumatic.   Abdominal:      General: There is no distension.      Palpations: Abdomen is soft.   Musculoskeletal:         " General: Normal range of motion.   Neurological:      Mental Status: She is alert.   Psychiatric:         Thought Content: Thought content normal.               Assessment:   1. Constipation, unspecified constipation type    2. Internal hemorrhoids with complication          Plan: I recommend fiber therapy and gave written instructions on how to achieve a high fiber diet.  Patient does not want to pursue any surgical management of the hemorrhoids since they are significantly better.  We discussed that the goal is to manage the constipation which will in turn make the hemorrhoids better.  Patient can continue to follow-up with Dr. Pettit for IBS management.  We will cancel colonoscopy with me.  Patient to follow-up for any surgical management.     I spent 24 minutes caring for Maryana Huber on this date of service. This time includes time spent by me in the following activities:preparing for the visit, reviewing tests, obtaining and/or reviewing a separately obtained history, performing a medically appropriate examination and/or evaluation , counseling and educating the patient/family/caregiver, ordering medications, tests, or procedures, referring and communicating with other health care professionals  and independently interpreting results and communicating that information with the patient/family/caregiver        Transcribed from ambient dictation for Denise Rees MD by Marija Turner.  03/15/22   15:36 EDT    Patient verbalized consent to the visit recording.  I have personally performed the services described in this document as transcribed by the above individual, and it is both accurate and complete.  Denise Rees MD  3/17/2022  13:16 EDT

## 2022-03-17 NOTE — TELEPHONE ENCOUNTER
Patient called. Advised her Amitiza has been approved.   Patient is requesting to change the time of her colonoscopy.   Advised will send an update to Dr. Pettit's .   She verb understanding.

## 2022-03-18 ENCOUNTER — TELEPHONE (OUTPATIENT)
Dept: GASTROENTEROLOGY | Facility: CLINIC | Age: 52
End: 2022-03-18

## 2022-03-18 NOTE — TELEPHONE ENCOUNTER
PT would like to see about getting a later time frame for her upcoming procedure in June 2022. Her prep will cause for her to get up at 4:30am and she has to work the night before. If not, PT will keep her current scheduled time if the next possible is a later date. Please call [phone] to discuss.

## 2022-04-06 ENCOUNTER — TELEMEDICINE (OUTPATIENT)
Dept: FAMILY MEDICINE CLINIC | Facility: TELEHEALTH | Age: 52
End: 2022-04-06

## 2022-04-06 DIAGNOSIS — B37.9 ANTIBIOTIC-INDUCED YEAST INFECTION: ICD-10-CM

## 2022-04-06 DIAGNOSIS — N39.0 URINARY TRACT INFECTION WITHOUT HEMATURIA, SITE UNSPECIFIED: Primary | ICD-10-CM

## 2022-04-06 DIAGNOSIS — T36.95XA ANTIBIOTIC-INDUCED YEAST INFECTION: ICD-10-CM

## 2022-04-06 PROCEDURE — 99213 OFFICE O/P EST LOW 20 MIN: CPT | Performed by: NURSE PRACTITIONER

## 2022-04-06 RX ORDER — AMOXICILLIN 500 MG/1
500 CAPSULE ORAL 2 TIMES DAILY
Qty: 20 CAPSULE | Refills: 0 | Status: SHIPPED | OUTPATIENT
Start: 2022-04-06 | End: 2022-04-16

## 2022-04-06 RX ORDER — FLUCONAZOLE 150 MG/1
TABLET ORAL
Qty: 2 TABLET | Refills: 0 | Status: SHIPPED | OUTPATIENT
Start: 2022-04-06 | End: 2022-06-27

## 2022-04-06 NOTE — PROGRESS NOTES
Mode of Visit: Video  Location of patient: home  You have chosen to receive care through a telehealth visit.  The patient has signed the video visit consent form.  The visit included audio and video interaction. No technical issues occurred during this visit.   Video Visit Reason:   Free Text Description: I believe I have a UTI. I have tried everything over the counter and home treatments. I was just treated for a yeast infection but since I have the UTI symptoms I feel I need an antibiotic. Thank you  Chief Complaint  Urinary Tract Infection    Subjective          Maryana Huber presents to [facility]    UTI symptoms for a few days after taking treatment for a yeast infection. Symptoms have worsened over the last few days. She has had improvement in the yeast infection but has some lingering symptoms. She denies fever, chills, abdominal pain.    Urinary Tract Infection   This is a new problem. The current episode started in the past 7 days. The problem occurs every urination. The problem has been gradually worsening. The quality of the pain is described as burning. There has been no fever. She is sexually active. There is no history of pyelonephritis. Associated symptoms include frequency and urgency. Pertinent negatives include no chills, discharge, flank pain, hematuria, hesitancy or nausea. She has tried home medications and increased fluids for the symptoms. The treatment provided no relief.   Review of Systems   Constitutional: Negative for chills and fever.   Gastrointestinal: Negative for nausea.   Genitourinary: Positive for dysuria, frequency and urgency. Negative for difficulty urinating, flank pain, hematuria, hesitancy and pelvic pain.     Objective   Vital Signs:   There were no vitals taken for this visit.    Physical Exam   Constitutional: No distress.   Pulmonary/Chest: Effort normal.  No respiratory distress.  Abdominal: Soft. She exhibits no distension.   Neurological:  She is alert.     Result Review :                 Assessment and Plan    Diagnoses and all orders for this visit:    1. Urinary tract infection without hematuria, site unspecified (Primary)  -     amoxicillin (AMOXIL) 500 MG capsule; Take 1 capsule by mouth 2 (Two) Times a Day for 10 days.  Dispense: 20 capsule; Refill: 0    2. Antibiotic-induced yeast infection  -     fluconazole (Diflucan) 150 MG tablet; 150 mg x 1 dose and may repeat in 3 days if needed.  Dispense: 2 tablet; Refill: 0        Increase fluids, anything but caffeine since it is a bladder irritant. Follow up with Urgent Care if symptoms worsen or the treatment provided does not resolve the illness. Go to the nearest Emergency Department for any signs of worsening kidney infection, such as severe back or abdominal pain with or without Nausea/Vomiting, fever, chills, hematuria or difficulty urinating.       I spent 20 minutes caring for Maryana on this date of service. This time includes time spent by me in the following activities:preparing for the visit, obtaining and/or reviewing a separately obtained history, performing a medically appropriate examination and/or evaluation , counseling and educating the patient/family/caregiver, ordering medications, tests, or procedures, and documenting information in the medical record  Follow Up   Return if symptoms worsen or fail to improve.  Patient was given instructions and counseling regarding her condition or for health maintenance advice. Please see specific information pulled into the AVS if appropriate.

## 2022-04-06 NOTE — PATIENT INSTRUCTIONS
Increase fluids, anything but caffeine since it is a bladder irritant. Follow up with Urgent Care if symptoms worsen or the treatment provided does not resolve the illness. Go to the nearest Emergency Department for any signs of worsening kidney infection, such as severe back or abdominal pain with or without Nausea/Vomiting, fever, chills, hematuria or difficulty urinating.

## 2022-04-12 ENCOUNTER — HOSPITAL ENCOUNTER (OUTPATIENT)
Dept: CT IMAGING | Facility: HOSPITAL | Age: 52
Discharge: HOME | End: 2022-04-12
Admitting: INTERNAL MEDICINE

## 2022-04-12 DIAGNOSIS — R14.0 ABDOMINAL BLOATING: ICD-10-CM

## 2022-04-12 LAB — CREAT BLDA-MCNC: 0.7 MG/DL (ref 0.6–1.3)

## 2022-04-12 PROCEDURE — 82565 ASSAY OF CREATININE: CPT

## 2022-04-12 PROCEDURE — 74177 CT ABD & PELVIS W/CONTRAST: CPT

## 2022-04-12 PROCEDURE — 25010000002 IOPAMIDOL 61 % SOLUTION: Performed by: INTERNAL MEDICINE

## 2022-04-12 RX ADMIN — IOPAMIDOL 85 ML: 612 INJECTION, SOLUTION INTRAVENOUS at 17:25

## 2022-04-14 ENCOUNTER — TELEPHONE (OUTPATIENT)
Dept: GASTROENTEROLOGY | Facility: CLINIC | Age: 52
End: 2022-04-14

## 2022-04-14 NOTE — PROGRESS NOTES
Benign cyst of the liver, no intervention required, normal CAT scan, await colonoscopy already scheduled

## 2022-04-14 NOTE — TELEPHONE ENCOUNTER
----- Message from Pb Pettit MD sent at 4/14/2022 10:05 AM EDT -----  Benign cyst of the liver, no intervention required, normal CAT scan, await colonoscopy already scheduled

## 2022-04-14 NOTE — TELEPHONE ENCOUNTER
Called pt and advised of Dr. Pettit's note.  Pt verbalized understanding.     Pt is asking for a change in time of colonoscopy.  Would like a later time.  Msg sent to Michelle.

## 2022-04-19 ENCOUNTER — OFFICE VISIT (OUTPATIENT)
Dept: FAMILY MEDICINE CLINIC | Facility: CLINIC | Age: 52
End: 2022-04-19

## 2022-04-19 VITALS
SYSTOLIC BLOOD PRESSURE: 130 MMHG | WEIGHT: 190 LBS | TEMPERATURE: 97.8 F | OXYGEN SATURATION: 99 % | HEIGHT: 66 IN | DIASTOLIC BLOOD PRESSURE: 80 MMHG | BODY MASS INDEX: 30.53 KG/M2 | HEART RATE: 83 BPM

## 2022-04-19 DIAGNOSIS — R30.0 DYSURIA: ICD-10-CM

## 2022-04-19 DIAGNOSIS — K58.9 IRRITABLE BOWEL SYNDROME, UNSPECIFIED TYPE: ICD-10-CM

## 2022-04-19 DIAGNOSIS — Z00.00 PHYSICAL EXAM, ANNUAL: Primary | ICD-10-CM

## 2022-04-19 PROBLEM — K12.0 APHTHOUS ULCER: Status: ACTIVE | Noted: 2022-04-19

## 2022-04-19 LAB
BACTERIA UR QL AUTO: NORMAL /HPF
BILIRUB UR QL STRIP: NEGATIVE
CLARITY UR: CLEAR
COLOR UR: YELLOW
GLUCOSE UR STRIP-MCNC: NEGATIVE MG/DL
HGB UR QL STRIP.AUTO: NEGATIVE
KETONES UR QL STRIP: NEGATIVE
LEUKOCYTE ESTERASE UR QL STRIP.AUTO: NEGATIVE
NITRITE UR QL STRIP: NEGATIVE
PH UR STRIP.AUTO: 6 [PH] (ref 4.6–8)
PROT UR QL STRIP: NEGATIVE
RBC # UR STRIP: NORMAL /HPF
REF LAB TEST METHOD: NORMAL
SP GR UR STRIP: 1.02 (ref 1–1.03)
SQUAMOUS #/AREA URNS HPF: NORMAL /HPF
UROBILINOGEN UR QL STRIP: NORMAL
WBC # UR STRIP: NORMAL /HPF

## 2022-04-19 PROCEDURE — 99396 PREV VISIT EST AGE 40-64: CPT | Performed by: INTERNAL MEDICINE

## 2022-04-19 PROCEDURE — 81001 URINALYSIS AUTO W/SCOPE: CPT | Performed by: INTERNAL MEDICINE

## 2022-04-19 PROCEDURE — 87086 URINE CULTURE/COLONY COUNT: CPT | Performed by: INTERNAL MEDICINE

## 2022-04-19 NOTE — PROGRESS NOTES
Dora Huber is a 52 y.o. female.     Vitals:    04/19/22 1412   BP: 130/80   Pulse: 83   Temp: 97.8 °F (36.6 °C)   SpO2: 99%      Body mass index is 30.68 kg/m².     History of Present Illness   Patient was seen for a physical.  Patient diet and physical activity were discussed at this visit.  Patient does have a history of irritable bowel syndrome and uses Amitiza.  Patient also had dysuria UA C&S was ordered and pending at the time of dictation.    Dictated utilizing Dragon dictation. If there are questions or for further clarification, please contact me.  The following portions of the patient's history were reviewed and updated as appropriate: allergies, current medications, past family history, past medical history, past social history, past surgical history and problem list.    Review of Systems   Constitutional: Negative for fatigue and fever.   HENT: Positive for congestion. Negative for trouble swallowing.    Eyes: Negative for discharge and visual disturbance.   Respiratory: Negative for choking and shortness of breath.    Cardiovascular: Negative for chest pain and palpitations.   Gastrointestinal: Negative for abdominal pain and blood in stool.   Endocrine: Negative.    Genitourinary: Positive for dysuria. Negative for genital sores and hematuria.   Musculoskeletal: Negative for gait problem and joint swelling.   Skin: Negative for color change, pallor, rash and wound.   Allergic/Immunologic: Positive for environmental allergies. Negative for immunocompromised state.   Neurological: Negative for facial asymmetry and speech difficulty.   Psychiatric/Behavioral: Negative for hallucinations and suicidal ideas.       Objective   Physical Exam  Vitals and nursing note reviewed.   Constitutional:       Appearance: Normal appearance. She is well-developed.   HENT:      Head: Normocephalic and atraumatic.      Nose: Nose normal.      Mouth/Throat:      Mouth: Mucous membranes are moist.       Pharynx: Oropharynx is clear.   Eyes:      Extraocular Movements: Extraocular movements intact.      Conjunctiva/sclera: Conjunctivae normal.      Pupils: Pupils are equal, round, and reactive to light.   Cardiovascular:      Rate and Rhythm: Normal rate and regular rhythm.      Heart sounds: Normal heart sounds. No murmur heard.    No friction rub. No gallop.   Pulmonary:      Effort: Pulmonary effort is normal. No respiratory distress.      Breath sounds: Normal breath sounds. No stridor. No wheezing, rhonchi or rales.   Chest:      Chest wall: No tenderness.   Abdominal:      General: Bowel sounds are normal.      Palpations: Abdomen is soft.   Musculoskeletal:         General: Normal range of motion.      Cervical back: Normal range of motion and neck supple.   Skin:     General: Skin is warm and dry.   Neurological:      General: No focal deficit present.      Mental Status: She is alert and oriented to person, place, and time. Mental status is at baseline.   Psychiatric:         Mood and Affect: Mood normal.         Behavior: Behavior normal.         Thought Content: Thought content normal.         Judgment: Judgment normal.         Assessment/Plan #1 physical #2 labs #3 UA C&S  Problems Addressed this Visit        Gastrointestinal Abdominal     IBS (irritable bowel syndrome)    Relevant Orders    CBC (No Diff)    Comprehensive Metabolic Panel    Lipid Panel    Vitamin D 25 Hydroxy    Vitamin B12 & Folate    JANIS    Rheumatoid Factor    Sedimentation Rate      Other Visit Diagnoses     Physical exam, annual    -  Primary    Dysuria        Relevant Orders    Urinalysis With Microscopic - Urine, Clean Catch    Urine Culture - Urine, Urine, Clean Catch      Diagnoses     Diagnosis Codes Comments    Physical exam, annual    -  Primary ICD-10-CM: Z00.00  ICD-9-CM: V70.0     Irritable bowel syndrome, unspecified type     ICD-10-CM: K58.9  ICD-9-CM: 564.1     Dysuria     ICD-10-CM: R30.0  ICD-9-CM: 788.1

## 2022-04-19 NOTE — TELEPHONE ENCOUNTER
JEFFREY flanagan for colonoscopy on 06/21 to 08/02  arrive at 1130am   . Mailed Prep instructions to Mailing address on-file. ----miralax

## 2022-04-20 ENCOUNTER — LAB (OUTPATIENT)
Dept: FAMILY MEDICINE CLINIC | Facility: CLINIC | Age: 52
End: 2022-04-20

## 2022-04-20 DIAGNOSIS — K58.9 IRRITABLE BOWEL SYNDROME, UNSPECIFIED TYPE: ICD-10-CM

## 2022-04-20 LAB
25(OH)D3 SERPL-MCNC: 9 NG/ML (ref 30–100)
ALBUMIN SERPL-MCNC: 5.2 G/DL (ref 3.5–5.2)
ALBUMIN/GLOB SERPL: 2.1 G/DL
ALP SERPL-CCNC: 57 U/L (ref 39–117)
ALT SERPL W P-5'-P-CCNC: 12 U/L (ref 1–33)
ANION GAP SERPL CALCULATED.3IONS-SCNC: 11.7 MMOL/L (ref 5–15)
AST SERPL-CCNC: 13 U/L (ref 1–32)
BACTERIA SPEC AEROBE CULT: NO GROWTH
BILIRUB SERPL-MCNC: 0.4 MG/DL (ref 0–1.2)
BUN SERPL-MCNC: 12 MG/DL (ref 6–20)
BUN/CREAT SERPL: 16.7 (ref 7–25)
CALCIUM SPEC-SCNC: 9.6 MG/DL (ref 8.6–10.5)
CHLORIDE SERPL-SCNC: 105 MMOL/L (ref 98–107)
CHOLEST SERPL-MCNC: 234 MG/DL (ref 0–200)
CHROMATIN AB SERPL-ACNC: <10 IU/ML (ref 0–14)
CO2 SERPL-SCNC: 24.3 MMOL/L (ref 22–29)
CREAT SERPL-MCNC: 0.72 MG/DL (ref 0.57–1)
DEPRECATED RDW RBC AUTO: 44.5 FL (ref 37–54)
EGFRCR SERPLBLD CKD-EPI 2021: 100.1 ML/MIN/1.73
ERYTHROCYTE [DISTWIDTH] IN BLOOD BY AUTOMATED COUNT: 13.1 % (ref 12.3–15.4)
ERYTHROCYTE [SEDIMENTATION RATE] IN BLOOD: 5 MM/HR (ref 0–30)
FOLATE SERPL-MCNC: 7.26 NG/ML (ref 4.78–24.2)
GLOBULIN UR ELPH-MCNC: 2.5 GM/DL
GLUCOSE SERPL-MCNC: 92 MG/DL (ref 65–99)
HCT VFR BLD AUTO: 39.2 % (ref 34–46.6)
HDLC SERPL-MCNC: 77 MG/DL (ref 40–60)
HGB BLD-MCNC: 12.4 G/DL (ref 12–15.9)
LDLC SERPL CALC-MCNC: 144 MG/DL (ref 0–100)
LDLC/HDLC SERPL: 1.84 {RATIO}
MCH RBC QN AUTO: 29 PG (ref 26.6–33)
MCHC RBC AUTO-ENTMCNC: 31.6 G/DL (ref 31.5–35.7)
MCV RBC AUTO: 91.8 FL (ref 79–97)
PLATELET # BLD AUTO: 346 10*3/MM3 (ref 140–450)
PMV BLD AUTO: 10.8 FL (ref 6–12)
POTASSIUM SERPL-SCNC: 4 MMOL/L (ref 3.5–5.2)
PROT SERPL-MCNC: 7.7 G/DL (ref 6–8.5)
RBC # BLD AUTO: 4.27 10*6/MM3 (ref 3.77–5.28)
SODIUM SERPL-SCNC: 141 MMOL/L (ref 136–145)
TRIGL SERPL-MCNC: 75 MG/DL (ref 0–150)
VIT B12 BLD-MCNC: 379 PG/ML (ref 211–946)
VLDLC SERPL-MCNC: 13 MG/DL (ref 5–40)
WBC NRBC COR # BLD: 4.73 10*3/MM3 (ref 3.4–10.8)

## 2022-04-20 PROCEDURE — 86431 RHEUMATOID FACTOR QUANT: CPT | Performed by: INTERNAL MEDICINE

## 2022-04-20 PROCEDURE — 85652 RBC SED RATE AUTOMATED: CPT | Performed by: INTERNAL MEDICINE

## 2022-04-20 PROCEDURE — 36415 COLL VENOUS BLD VENIPUNCTURE: CPT | Performed by: INTERNAL MEDICINE

## 2022-04-20 PROCEDURE — 82746 ASSAY OF FOLIC ACID SERUM: CPT | Performed by: INTERNAL MEDICINE

## 2022-04-20 PROCEDURE — 85027 COMPLETE CBC AUTOMATED: CPT | Performed by: INTERNAL MEDICINE

## 2022-04-20 PROCEDURE — 80061 LIPID PANEL: CPT | Performed by: INTERNAL MEDICINE

## 2022-04-20 PROCEDURE — 82306 VITAMIN D 25 HYDROXY: CPT | Performed by: INTERNAL MEDICINE

## 2022-04-20 PROCEDURE — 80053 COMPREHEN METABOLIC PANEL: CPT | Performed by: INTERNAL MEDICINE

## 2022-04-20 PROCEDURE — 82607 VITAMIN B-12: CPT | Performed by: INTERNAL MEDICINE

## 2022-04-21 LAB — ANA SER QL: NEGATIVE

## 2022-04-25 DIAGNOSIS — E78.00 PURE HYPERCHOLESTEROLEMIA: Primary | ICD-10-CM

## 2022-04-25 RX ORDER — ERGOCALCIFEROL 1.25 MG/1
50000 CAPSULE ORAL WEEKLY
Qty: 16 CAPSULE | Refills: 3 | Status: SHIPPED | OUTPATIENT
Start: 2022-04-25

## 2022-04-25 RX ORDER — ATORVASTATIN CALCIUM 40 MG/1
40 TABLET, FILM COATED ORAL NIGHTLY
Qty: 90 TABLET | Refills: 1 | Status: SHIPPED | OUTPATIENT
Start: 2022-04-25

## 2022-06-17 ENCOUNTER — TELEPHONE (OUTPATIENT)
Dept: GASTROENTEROLOGY | Facility: CLINIC | Age: 52
End: 2022-06-17

## 2022-06-27 ENCOUNTER — OFFICE VISIT (OUTPATIENT)
Dept: SURGERY | Facility: CLINIC | Age: 52
End: 2022-06-27

## 2022-06-27 VITALS
HEIGHT: 66 IN | BODY MASS INDEX: 28.48 KG/M2 | HEART RATE: 86 BPM | DIASTOLIC BLOOD PRESSURE: 68 MMHG | OXYGEN SATURATION: 95 % | SYSTOLIC BLOOD PRESSURE: 110 MMHG | WEIGHT: 177.2 LBS

## 2022-06-27 DIAGNOSIS — K64.4 EXTERNAL HEMORRHOIDS WITH COMPLICATION: Primary | ICD-10-CM

## 2022-06-27 PROCEDURE — 99214 OFFICE O/P EST MOD 30 MIN: CPT | Performed by: PHYSICIAN ASSISTANT

## 2022-06-27 RX ORDER — HYDROCORTISONE 25 MG/G
CREAM TOPICAL
Qty: 30 G | Refills: 1 | Status: SHIPPED | OUTPATIENT
Start: 2022-06-27

## 2022-08-01 ENCOUNTER — TELEMEDICINE (OUTPATIENT)
Dept: FAMILY MEDICINE CLINIC | Facility: CLINIC | Age: 52
End: 2022-08-01

## 2022-08-01 DIAGNOSIS — J06.9 ACUTE URI: Primary | ICD-10-CM

## 2022-08-01 PROCEDURE — 99213 OFFICE O/P EST LOW 20 MIN: CPT | Performed by: NURSE PRACTITIONER

## 2022-08-01 RX ORDER — AMOXICILLIN 500 MG/1
500 CAPSULE ORAL 2 TIMES DAILY
Qty: 14 CAPSULE | Refills: 0 | Status: SHIPPED | OUTPATIENT
Start: 2022-08-01 | End: 2022-08-05 | Stop reason: ALTCHOICE

## 2022-08-01 NOTE — PROGRESS NOTES
"Chief Complaint  Nasal congestion, sore throat and earache    Subjective        Maryana Harness presents to [facility]  History of Present Illness   52-year-old female presenting via video visit with complaints of nasal congestion, sore throat and bilateral ear fullness, states she took  two amoxicillin pills she had left over from UTI in April and started feeling a little better. She denies fever, loss of taste or smell, N/V/D or known COVID exposure states she works from home and has only been out to the grocery store in the last 3 weeks and wears a mask when in public, will extend amoxicillin 500 twice daily for 7 days, patient to also continue using Flonase and to follow-up if symptoms not improved.      Objective   Vital Signs: N/A - Video Visit   There were no vitals taken for this visit.  Estimated body mass index is 28.6 kg/m² as calculated from the following:    Height as of 6/27/22: 167.6 cm (66\").    Weight as of 6/27/22: 80.4 kg (177 lb 3.2 oz).          Physical Exam  Neurological:      Mental Status: She is alert and oriented to person, place, and time.   Psychiatric:         Mood and Affect: Mood normal.         Behavior: Behavior normal.     Unable to fully assess related to video visit    Result Review :                Assessment and Plan   Diagnoses and all orders for this visit:    1. Acute URI (Primary)  -     amoxicillin (AMOXIL) 500 MG capsule; Take 1 capsule by mouth 2 (Two) Times a Day.  Dispense: 14 capsule; Refill: 0             Follow Up {Instructions Charge Capture  Follow-up Communications :23}  Return if symptoms worsen or fail to improve.  Patient was given instructions and counseling regarding her condition or for health maintenance advice. Please see specific information pulled into the AVS if appropriate.       "

## 2022-08-05 ENCOUNTER — OFFICE VISIT (OUTPATIENT)
Dept: FAMILY MEDICINE CLINIC | Facility: CLINIC | Age: 52
End: 2022-08-05

## 2022-08-05 VITALS
SYSTOLIC BLOOD PRESSURE: 102 MMHG | HEART RATE: 89 BPM | OXYGEN SATURATION: 99 % | HEIGHT: 66 IN | TEMPERATURE: 97.8 F | WEIGHT: 180 LBS | DIASTOLIC BLOOD PRESSURE: 70 MMHG | BODY MASS INDEX: 28.93 KG/M2

## 2022-08-05 DIAGNOSIS — I10 ESSENTIAL HYPERTENSION: Primary | ICD-10-CM

## 2022-08-05 DIAGNOSIS — J01.00 SUBACUTE MAXILLARY SINUSITIS: ICD-10-CM

## 2022-08-05 PROCEDURE — 99213 OFFICE O/P EST LOW 20 MIN: CPT | Performed by: INTERNAL MEDICINE

## 2022-08-05 RX ORDER — AZITHROMYCIN 250 MG/1
TABLET, FILM COATED ORAL
Qty: 6 TABLET | Refills: 0 | Status: SHIPPED | OUTPATIENT
Start: 2022-08-05 | End: 2022-10-16

## 2022-08-05 RX ORDER — FLUTICASONE PROPIONATE 50 MCG
1 SPRAY, SUSPENSION (ML) NASAL DAILY
Qty: 18.2 ML | Refills: 3 | Status: SHIPPED | OUTPATIENT
Start: 2022-08-05 | End: 2023-03-06

## 2022-08-05 NOTE — PROGRESS NOTES
"Chief Complaint  Headache, Sore Throat, sinus congestion, and Ear Fullness    Subjective        Maryana Harness presents to [facility]  History of Present Illness patient was seen for hypertension.  Blood pressures been running 110s over 70s.  Patient will continue valsartan 360 mg daily.  Patient also had maxillary sinusitis candidate been taking Amoxil 500 mg 3 times daily for 10 days.  Patient was improved but not where she wanted to be.  Patient did have maxillary tenderness and was placed on a Z-Leo and fluticasone was ordered.  Patient will follow-up in 4 days if not better.    Objective   Vital Signs:  Blood Pressure 102/70 (BP Location: Right arm, Patient Position: Sitting, Cuff Size: Adult)   Pulse 89   Temperature 97.8 °F (36.6 °C) (Infrared)   Height 167.6 cm (65.98\")   Weight 81.6 kg (180 lb)   Oxygen Saturation 99%   Body Mass Index 29.07 kg/m²   Estimated body mass index is 29.07 kg/m² as calculated from the following:    Height as of this encounter: 167.6 cm (65.98\").    Weight as of this encounter: 81.6 kg (180 lb).          Physical Exam  Vitals and nursing note reviewed.   Constitutional:       Appearance: Normal appearance. She is well-developed.   HENT:      Head: Normocephalic and atraumatic.      Comments: Maxillary tenderness     Nose: Nose normal.      Mouth/Throat:      Mouth: Mucous membranes are moist.      Pharynx: Oropharynx is clear.   Eyes:      Extraocular Movements: Extraocular movements intact.      Conjunctiva/sclera: Conjunctivae normal.      Pupils: Pupils are equal, round, and reactive to light.   Cardiovascular:      Rate and Rhythm: Normal rate and regular rhythm.      Heart sounds: Normal heart sounds. No murmur heard.    No friction rub. No gallop.   Pulmonary:      Effort: Pulmonary effort is normal. No respiratory distress.      Breath sounds: Normal breath sounds. No stridor. No wheezing, rhonchi or rales.   Chest:      Chest wall: No " tenderness.   Abdominal:      General: Bowel sounds are normal.      Palpations: Abdomen is soft.   Musculoskeletal:         General: Normal range of motion.      Cervical back: Normal range of motion and neck supple.   Skin:     General: Skin is warm and dry.   Neurological:      General: No focal deficit present.      Mental Status: She is alert and oriented to person, place, and time. Mental status is at baseline.   Psychiatric:         Mood and Affect: Mood normal.         Behavior: Behavior normal.         Thought Content: Thought content normal.         Judgment: Judgment normal.        Result Review :                Assessment and Plan  #1 continue monitoring blood pressure #2  Z-Leo with fluticasone  Diagnoses and all orders for this visit:    1. Essential hypertension (Primary)    2. Subacute maxillary sinusitis    Other orders  -     azithromycin (Zithromax Z-Leo) 250 MG tablet; Take 2 tablets by mouth on day 1, then 1 tablet daily on days 2-5  Dispense: 6 tablet; Refill: 0  -     fluticasone (FLONASE) 50 MCG/ACT nasal spray; 1 spray into the nostril(s) as directed by provider Daily.  Dispense: 18.2 mL; Refill: 3             Follow Up   Return in about 6 months (around 2/5/2023), or if symptoms worsen or fail to improve, for Recheck.  Patient was given instructions and counseling regarding her condition or for health maintenance advice. Please see specific information pulled into the AVS if appropriate.

## 2022-09-23 ENCOUNTER — TELEPHONE (OUTPATIENT)
Dept: FAMILY MEDICINE CLINIC | Facility: CLINIC | Age: 52
End: 2022-09-23

## 2022-09-23 NOTE — TELEPHONE ENCOUNTER
Pt states with last booster she had a little tightness in throat but wasn't severe and it went away within 24 hours. Informed pt she needs to let whoever is giving the injection know about that reaction

## 2022-09-23 NOTE — TELEPHONE ENCOUNTER
Caller: Maryana Huber    Relationship: Self    Best call back number:     What is the best time to reach you:     Who are you requesting to speak with (clinical staff, provider,  specific staff member):    Do you know the name of the person who called:    What was the call regarding: PATIENT IS CALLING IN TO LET DR BURRIS KNOW THAT HER JOB IS PROVIDING THE COVID BOOSTER VIVALANT AND SHE WANTS TO KNOW IF THIS IS OK FOR HER TO GET AND IF SO HOW LONG SHOULD SHE WAIT AFTER GETTING THIS BEFORE SHE SHOULD HAVE HER FLU SHOT.  SHE WANTS TO BE CONTACTED NO LATER THAN 09/26/22 REGARDING THIS AS THE SCHEDULE FOR THE SHOT IS FILLING UP FAST. SHE ALSO WANTS TO KNOW IF THIS BOOSTER HAS SIDE AFFECTS    Do you require a callback: YES

## 2022-09-30 ENCOUNTER — IMMUNIZATION (OUTPATIENT)
Dept: VACCINE CLINIC | Facility: HOSPITAL | Age: 52
End: 2022-09-30

## 2022-09-30 DIAGNOSIS — Z23 NEED FOR VACCINATION: Primary | ICD-10-CM

## 2022-09-30 PROCEDURE — 0124A: CPT | Performed by: INTERNAL MEDICINE

## 2022-09-30 PROCEDURE — 91312 HC SARSCOV2 VAC 30MCG/0.3ML IM BIVALENT BOOSTER 12 YRS AND OLDER: CPT | Performed by: INTERNAL MEDICINE

## 2022-11-01 ENCOUNTER — OFFICE VISIT (OUTPATIENT)
Dept: FAMILY MEDICINE CLINIC | Facility: CLINIC | Age: 52
End: 2022-11-01

## 2022-11-01 VITALS
BODY MASS INDEX: 28.35 KG/M2 | TEMPERATURE: 98.2 F | OXYGEN SATURATION: 99 % | DIASTOLIC BLOOD PRESSURE: 68 MMHG | SYSTOLIC BLOOD PRESSURE: 104 MMHG | WEIGHT: 176.4 LBS | HEIGHT: 66 IN | HEART RATE: 100 BPM

## 2022-11-01 DIAGNOSIS — H66.003 NON-RECURRENT ACUTE SUPPURATIVE OTITIS MEDIA OF BOTH EARS WITHOUT SPONTANEOUS RUPTURE OF TYMPANIC MEMBRANES: ICD-10-CM

## 2022-11-01 DIAGNOSIS — J02.9 SORE THROAT: ICD-10-CM

## 2022-11-01 DIAGNOSIS — I10 ESSENTIAL HYPERTENSION: ICD-10-CM

## 2022-11-01 DIAGNOSIS — J01.00 SUBACUTE MAXILLARY SINUSITIS: Primary | ICD-10-CM

## 2022-11-01 PROCEDURE — 99214 OFFICE O/P EST MOD 30 MIN: CPT | Performed by: NURSE PRACTITIONER

## 2022-11-01 NOTE — PROGRESS NOTES
"Chief Complaint  Sinus Problem (ears), Sore Throat (Allergies no coughing), and Headache    Subjective        Maryana Huber presents to [facility]  History of Present Illness  Patient presents to the office today for recurrent sinusitis.  She reports she has been treated with amoxicillin and cefdinir.  She reports that when she stops antibiotics symptoms are returned.  She is having drainage and sore throat.  She does rehab seasonal allergies and is not currently taking allergy medication.  She is reporting a sinus headache.  She has had bilateral earache and pressure.  She denies drainage from ears.  Blood pressure is 104/60.  She is not actively taking any medication today.  She denies fever and/or chills.      Objective   Vital Signs:  /68   Pulse 100   Temp 98.2 °F (36.8 °C)   Ht 167.6 cm (66\")   Wt 80 kg (176 lb 6.4 oz)   SpO2 99%   BMI 28.47 kg/m²   Estimated body mass index is 28.47 kg/m² as calculated from the following:    Height as of this encounter: 167.6 cm (66\").    Weight as of this encounter: 80 kg (176 lb 6.4 oz).    BMI is >= 25 and <30. (Overweight) The following options were offered after discussion;: weight loss educational material (shared in after visit summary), exercise counseling/recommendations and nutrition counseling/recommendations      Physical Exam  Constitutional:       Appearance: Normal appearance.   HENT:      Right Ear: Ear canal and external ear normal. No decreased hearing noted. Tenderness present. No drainage. No foreign body. Tympanic membrane is erythematous. Tympanic membrane is not perforated.      Left Ear: External ear normal. No decreased hearing noted. Tenderness present. No drainage. No foreign body. Tympanic membrane is erythematous. Tympanic membrane is not perforated.      Nose: Nasal tenderness, mucosal edema, congestion and rhinorrhea present.      Right Turbinates: Not enlarged.      Left Turbinates: Not enlarged.      " Right Sinus: Maxillary sinus tenderness and frontal sinus tenderness present.      Left Sinus: Maxillary sinus tenderness and frontal sinus tenderness present.      Mouth/Throat:      Mouth: Mucous membranes are moist.      Pharynx: Oropharyngeal exudate and posterior oropharyngeal erythema present.   Eyes:      General:         Right eye: No discharge.         Left eye: No discharge.      Conjunctiva/sclera: Conjunctivae normal.   Cardiovascular:      Rate and Rhythm: Normal rate and regular rhythm.      Pulses: Normal pulses.      Heart sounds: Normal heart sounds. No murmur heard.  Pulmonary:      Breath sounds: Normal breath sounds. No wheezing or rhonchi.   Chest:      Chest wall: No tenderness.   Musculoskeletal:      Cervical back: No rigidity or tenderness.   Skin:     General: Skin is warm and dry.      Coloration: Skin is not pale.      Findings: No erythema.   Neurological:      Mental Status: She is alert.        Result Review :  The following data was reviewed by: TERRELL Paris on 11/01/2022:  Common labs    Common Labs 4/12/22 4/20/22 4/20/22 4/20/22     1602 1602 1602   Glucose   92    BUN   12    Creatinine 0.70  0.72    Sodium   141    Potassium   4.0    Chloride   105    Calcium   9.6    Albumin   5.20    Total Bilirubin   0.4    Alkaline Phosphatase   57    AST (SGOT)   13    ALT (SGPT)   12    WBC  4.73     Hemoglobin  12.4     Hematocrit  39.2     Platelets  346     Total Cholesterol    234 (A)   Triglycerides    75   HDL Cholesterol    77 (A)   LDL Cholesterol     144 (A)   (A) Abnormal value       Comments are available for some flowsheets but are not being displayed.                    Assessment and Plan   Diagnoses and all orders for this visit:    1. Subacute maxillary sinusitis (Primary)  Assessment & Plan:  Due to multiple sinus infections will refer for sinus x-ray.  Radiology to read final report.  Advised patient to start allergy medicine Zyrtec OTC    Orders:  -     XR  sinuses 3+ vw; Future  -     Rapid Strep A Screen - Swab, Throat  -     CBC w AUTO Differential; Future    2. Sore throat  Assessment & Plan:  Ordered strep test patient to make a lab only visit to have throat swab.  Advised to use salt water gargles.    Orders:  -     XR sinuses 3+ vw; Future  -     Rapid Strep A Screen - Swab, Throat  -     CBC w AUTO Differential; Future    3. Non-recurrent acute suppurative otitis media of both ears without spontaneous rupture of tympanic membranes  -     neomycin-polymyxin-hydrocortisone (CORTISPORIN) 3.5-70701-1 otic solution; Administer 3 drops into both ears 4 (Four) Times a Day.  Dispense: 10 mL; Refill: 0    4. Essential hypertension  Assessment & Plan:  Blood pressure stable.           I spent 15 minutes caring for Maryana on this date of service. This time includes time spent by me in the following activities:reviewing tests, obtaining and/or reviewing a separately obtained history, performing a medically appropriate examination and/or evaluation , counseling and educating the patient/family/caregiver, ordering medications, tests, or procedures, referring and communicating with other health care professionals , documenting information in the medical record, independently interpreting results and communicating that information with the patient/family/caregiver and care coordination  Follow Up   Return in about 6 months (around 4/21/2023) for Annual physical.  Patient was given instructions and counseling regarding her condition or for health maintenance advice. Please see specific information pulled into the AVS if appropriate.

## 2022-11-02 PROBLEM — H66.003 NON-RECURRENT ACUTE SUPPURATIVE OTITIS MEDIA OF BOTH EARS WITHOUT SPONTANEOUS RUPTURE OF TYMPANIC MEMBRANES: Status: ACTIVE | Noted: 2022-11-02

## 2022-11-02 PROBLEM — J01.00 SUBACUTE MAXILLARY SINUSITIS: Status: ACTIVE | Noted: 2022-11-02

## 2022-11-02 PROBLEM — J02.9 SORE THROAT: Status: ACTIVE | Noted: 2022-11-02

## 2022-11-02 NOTE — ASSESSMENT & PLAN NOTE
Due to multiple sinus infections will refer for sinus x-ray.  Radiology to read final report.  Advised patient to start allergy medicine Zyrtec OTC

## 2022-11-02 NOTE — ASSESSMENT & PLAN NOTE
Ordered strep test patient to make a lab only visit to have throat swab.  Advised to use salt water gargles.

## 2022-11-04 ENCOUNTER — HOSPITAL ENCOUNTER (OUTPATIENT)
Dept: GENERAL RADIOLOGY | Facility: HOSPITAL | Age: 52
Discharge: HOME | End: 2022-11-04
Admitting: NURSE PRACTITIONER

## 2022-11-04 ENCOUNTER — LAB (OUTPATIENT)
Dept: FAMILY MEDICINE CLINIC | Facility: CLINIC | Age: 52
End: 2022-11-04

## 2022-11-04 DIAGNOSIS — J01.00 SUBACUTE MAXILLARY SINUSITIS: ICD-10-CM

## 2022-11-04 DIAGNOSIS — J02.9 SORE THROAT: ICD-10-CM

## 2022-11-04 LAB
ERYTHROCYTE [DISTWIDTH] IN BLOOD BY AUTOMATED COUNT: 13.7 % (ref 12.3–15.4)
HCT VFR BLD AUTO: 34.5 % (ref 34–46.6)
HGB BLD-MCNC: 11.5 G/DL (ref 12–15.9)
LYMPHOCYTES # BLD AUTO: 2 10*3/MM3 (ref 0.7–3.1)
LYMPHOCYTES NFR BLD AUTO: 35.8 % (ref 19.6–45.3)
MCH RBC QN AUTO: 29.1 PG (ref 26.6–33)
MCHC RBC AUTO-ENTMCNC: 33.2 G/DL (ref 31.5–35.7)
MCV RBC AUTO: 87.5 FL (ref 79–97)
MONOCYTES # BLD AUTO: 0.3 10*3/MM3 (ref 0.1–0.9)
MONOCYTES NFR BLD AUTO: 5.8 % (ref 5–12)
NEUTROPHILS NFR BLD AUTO: 3.2 10*3/MM3 (ref 1.7–7)
NEUTROPHILS NFR BLD AUTO: 58.4 % (ref 42.7–76)
PLATELET # BLD AUTO: 317 10*3/MM3 (ref 140–450)
PMV BLD AUTO: 7.9 FL (ref 6–12)
RBC # BLD AUTO: 3.94 10*6/MM3 (ref 3.77–5.28)
S PYO AG THROAT QL: NEGATIVE
WBC NRBC COR # BLD: 5.5 10*3/MM3 (ref 3.4–10.8)

## 2022-11-04 PROCEDURE — 85025 COMPLETE CBC W/AUTO DIFF WBC: CPT | Performed by: NURSE PRACTITIONER

## 2022-11-04 PROCEDURE — 87880 STREP A ASSAY W/OPTIC: CPT | Performed by: NURSE PRACTITIONER

## 2022-11-04 PROCEDURE — 36415 COLL VENOUS BLD VENIPUNCTURE: CPT | Performed by: NURSE PRACTITIONER

## 2022-11-04 PROCEDURE — 70220 X-RAY EXAM OF SINUSES: CPT

## 2023-01-24 ENCOUNTER — TELEPHONE (OUTPATIENT)
Dept: FAMILY MEDICINE CLINIC | Facility: CLINIC | Age: 53
End: 2023-01-24

## 2023-01-24 DIAGNOSIS — B00.9 HSV-1 (HERPES SIMPLEX VIRUS 1) INFECTION: ICD-10-CM

## 2023-01-24 RX ORDER — VALACYCLOVIR HYDROCHLORIDE 1 G/1
TABLET, FILM COATED ORAL
Qty: 20 TABLET | Refills: 4 | Status: SHIPPED | OUTPATIENT
Start: 2023-01-24

## 2023-01-24 RX ORDER — SUMATRIPTAN 50 MG/1
TABLET, FILM COATED ORAL
Qty: 10 TABLET | Refills: 3 | Status: SHIPPED | OUTPATIENT
Start: 2023-01-24

## 2023-01-24 NOTE — TELEPHONE ENCOUNTER
Caller: Maryana Huber    Relationship: Self    Best call back number: [phone]    What medication are you requesting: valACYclovir (Valtrex) 1000 MG tablet, AND SUMATRIPTAN    If a prescription is needed, what is your preferred pharmacy and phone number: [facility] [id number] [place], [place] - [address] AT [facility] [address] [facility] [phone] [facility] [phone] FX     Additional notes: PATIENT STATES THAT SHE HAD FORGOTTEN TO GET THE TWO MEDICATION SWITCHED OVER FROM HER PREVIOUS PROVIDER THE LAST TIME SHE WAS IN  TO SEE DR BURRIS, AND SHE IS NEARLY OUT OF BOTH. REQUESTS NEW PRESCRIPTIONS FOR CONTINUED MEDICATION. PLEASE TYLOR AND ADVISE IF NECESSARY

## 2023-01-26 ENCOUNTER — TELEPHONE (OUTPATIENT)
Dept: FAMILY MEDICINE CLINIC | Facility: CLINIC | Age: 53
End: 2023-01-26
Payer: COMMERCIAL

## 2023-01-26 DIAGNOSIS — H65.06 RECURRENT ACUTE SEROUS OTITIS MEDIA OF BOTH EARS: ICD-10-CM

## 2023-01-26 RX ORDER — AZITHROMYCIN 250 MG/1
TABLET, FILM COATED ORAL
Qty: 6 TABLET | Refills: 0 | Status: SHIPPED | OUTPATIENT
Start: 2023-01-26 | End: 2023-02-09

## 2023-01-26 NOTE — TELEPHONE ENCOUNTER
Caller: Maryana Huber    Relationship: Self    Best call back number: [phone]    What medication are you requesting: ANTIBIOTICS    What are your current symptoms: LEFT EAR PAIN, DRAINAGE, SORE THROAT, NASAL CONGESTION    How long have you been experiencing symptoms: 01.25.23    Have you had these symptoms before:    [x] Yes  [] No    Have you been treated for these symptoms before:   [x] Yes  [] No    If a prescription is needed, what is your preferred pharmacy and phone number: [facility] [id number] - [facility] [address] AT [facility] [address] [facility] [phone] [facility] [phone] FX     Additional notes: PATIENT STATED THAT DUE TO HER WORK SCHEDULE SHE WILL NOT BE ABLE TO MAKE AN APPOINTMENT.     PATIENT WOULD LIKE TO KNOW IF THIS MEDICATION CAN BE PRESCRIBED.     PATIENT WOULD LIKE TO GET THIS MEDICATION TO HELP WITH HER SYMPTOMS.     PATIENT STATED SHE WOULD LIKE TO GET THIS TOOK CARE OF DUE TO HER SON BEING A TYPE 1 DIABETIC AND WHEN HE GETS SICK HIS BLOOD SUGAR LEVELS RAISE.     PLEASE CALL TO DISCUSS AND ADVISE.

## 2023-01-27 ENCOUNTER — OFFICE VISIT (OUTPATIENT)
Dept: FAMILY MEDICINE CLINIC | Facility: CLINIC | Age: 53
End: 2023-01-27
Payer: COMMERCIAL

## 2023-01-27 VITALS
HEART RATE: 94 BPM | SYSTOLIC BLOOD PRESSURE: 112 MMHG | WEIGHT: 178.2 LBS | OXYGEN SATURATION: 99 % | DIASTOLIC BLOOD PRESSURE: 72 MMHG | HEIGHT: 66 IN | TEMPERATURE: 97.8 F | BODY MASS INDEX: 28.64 KG/M2

## 2023-01-27 DIAGNOSIS — R53.83 OTHER FATIGUE: ICD-10-CM

## 2023-01-27 DIAGNOSIS — J02.9 SORE THROAT: Primary | ICD-10-CM

## 2023-01-27 DIAGNOSIS — R11.0 NAUSEA: ICD-10-CM

## 2023-01-27 DIAGNOSIS — J30.2 SEASONAL ALLERGIES: ICD-10-CM

## 2023-01-27 DIAGNOSIS — I10 ESSENTIAL HYPERTENSION: ICD-10-CM

## 2023-01-27 LAB
EXPIRATION DATE: NORMAL
EXPIRATION DATE: NORMAL
FLUAV AG UPPER RESP QL IA.RAPID: NOT DETECTED
FLUBV AG UPPER RESP QL IA.RAPID: NOT DETECTED
INTERNAL CONTROL: NORMAL
INTERNAL CONTROL: NORMAL
Lab: NORMAL
Lab: NORMAL
S PYO AG THROAT QL: NEGATIVE
SARS-COV-2 AG UPPER RESP QL IA.RAPID: NORMAL

## 2023-01-27 PROCEDURE — 99213 OFFICE O/P EST LOW 20 MIN: CPT | Performed by: NURSE PRACTITIONER

## 2023-01-27 PROCEDURE — 87880 STREP A ASSAY W/OPTIC: CPT | Performed by: NURSE PRACTITIONER

## 2023-01-27 PROCEDURE — 87428 SARSCOV & INF VIR A&B AG IA: CPT | Performed by: NURSE PRACTITIONER

## 2023-01-27 RX ORDER — MONTELUKAST SODIUM 10 MG/1
10 TABLET ORAL NIGHTLY
Qty: 90 TABLET | Refills: 1 | Status: SHIPPED | OUTPATIENT
Start: 2023-01-27

## 2023-01-27 RX ORDER — ONDANSETRON 4 MG/1
4 TABLET, FILM COATED ORAL EVERY 8 HOURS PRN
Qty: 21 TABLET | Refills: 0 | Status: SHIPPED | OUTPATIENT
Start: 2023-01-27

## 2023-01-27 NOTE — PROGRESS NOTES
"Chief Complaint  Sore Throat, Back Pain, Neck Pain, Blurred Vision, and Fatigue    Dora Huber presents to [facility]  History of Present Illness  Patient presents office today with symptoms of sore throat and fatigue.  She has reported some neck and back pain.  She denies numbness and or tingling.  She has nausea without vomiting.  Blood pressure today is 112/72.  She denies chest pain shortness of air.        Objective   Vital Signs:  /72 (BP Location: Left arm, Patient Position: Sitting, Cuff Size: Adult)   Pulse 94   Temp 97.8 °F (36.6 °C)   Ht 167.6 cm (66\")   Wt 80.8 kg (178 lb 3.2 oz)   SpO2 99%   BMI 28.76 kg/m²   Estimated body mass index is 28.76 kg/m² as calculated from the following:    Height as of this encounter: 167.6 cm (66\").    Weight as of this encounter: 80.8 kg (178 lb 3.2 oz).       BMI is >= 25 and <30. (Overweight) The following options were offered after discussion;: weight loss educational material (shared in after visit summary)      Physical Exam  Constitutional:       Appearance: Normal appearance.   HENT:      Right Ear: Tympanic membrane, ear canal and external ear normal. No decreased hearing noted. No drainage or tenderness. No foreign body. Tympanic membrane is not perforated or erythematous.      Left Ear: Tympanic membrane and external ear normal. No decreased hearing noted. No drainage or tenderness. No foreign body. Tympanic membrane is not perforated or erythematous.      Nose: Nasal tenderness, mucosal edema, congestion and rhinorrhea present.      Right Turbinates: Not enlarged.      Left Turbinates: Not enlarged.      Right Sinus: Maxillary sinus tenderness and frontal sinus tenderness present.      Left Sinus: Maxillary sinus tenderness and frontal sinus tenderness present.      Mouth/Throat:      Mouth: Mucous membranes are moist.      Pharynx: Oropharyngeal exudate and posterior oropharyngeal erythema " present.   Eyes:      General:         Right eye: No discharge.         Left eye: No discharge.      Conjunctiva/sclera: Conjunctivae normal.   Cardiovascular:      Rate and Rhythm: Normal rate and regular rhythm.      Pulses: Normal pulses.      Heart sounds: Normal heart sounds. No murmur heard.  Pulmonary:      Breath sounds: Normal breath sounds. No wheezing or rhonchi.   Chest:      Chest wall: No tenderness.   Musculoskeletal:      Cervical back: No rigidity or tenderness.      Lumbar back: Spasms and tenderness present. Decreased range of motion.   Skin:     General: Skin is warm and dry.      Coloration: Skin is not pale.      Findings: No erythema.   Neurological:      Mental Status: She is alert.        Result Review :  The following data was reviewed by: TERRELL Paris on 01/27/2023:  Common labs    Common Labs 4/12/22 4/20/22 4/20/22 4/20/22 11/4/22     1602 1602 1602    Glucose   92     BUN   12     Creatinine 0.70  0.72     Sodium   141     Potassium   4.0     Chloride   105     Calcium   9.6     Albumin   5.20     Total Bilirubin   0.4     Alkaline Phosphatase   57     AST (SGOT)   13     ALT (SGPT)   12     WBC  4.73   5.50   Hemoglobin  12.4   11.5 (A)   Hematocrit  39.2   34.5   Platelets  346   317   Total Cholesterol    234 (A)    Triglycerides    75    HDL Cholesterol    77 (A)    LDL Cholesterol     144 (A)    (A) Abnormal value       Comments are available for some flowsheets but are not being displayed.           Data reviewed: Radiologic studies sinus xray              Assessment and Plan   Diagnoses and all orders for this visit:    1. Sore throat (Primary)  -     POCT SARS-CoV-2 Antigen BON + Flu  -     POCT rapid strep A    2. Other fatigue  -     POCT SARS-CoV-2 Antigen BON + Flu  -     POCT rapid strep A    3. Essential hypertension    4. Seasonal allergies  -     montelukast (Singulair) 10 MG tablet; Take 1 tablet by mouth Every Night.  Dispense: 90 tablet; Refill: 1    5.  Nausea  -     ondansetron (Zofran) 4 MG tablet; Take 1 tablet by mouth Every 8 (Eight) Hours As Needed for Nausea or Vomiting.  Dispense: 21 tablet; Refill: 0    Advised patient to rest and to increase fluid intake.  Tylenol or Motrin for fever, pain or discomfort as directed.  Discussed medication instructions and side effects with patient.  Follow-up if symptoms persist or worsen.  Sore throat-negative for strep take Tylenol or ibuprofen use salt water gargles.    Fatigue-continue taking daily vitamins increase rest    Hypertension stable continue Dash diet and exercise.    Seasonal allergies start Singulair    Nausea start Zofran as needed.  If symptoms or not improving start brat diet      This is most likely viral and will need to resolve with time. Antibiotics do not help viral infections. They typically resolve with treatment of symptoms in 7-10 days.  If you are still feeling bad after 7-10 days total, then contact office and we may or may not start antibiotic.  Appropriate PPE utilized throughout this patient encounter to include n95 mask per current protocol. Hand hygiene was performed before donning PPE and after removal when leaving the room.            I spent 30 minutes caring for Maryana on this date of service. This time includes time spent by me in the following activities:preparing for the visit, reviewing tests, obtaining and/or reviewing a separately obtained history, performing a medically appropriate examination and/or evaluation , counseling and educating the patient/family/caregiver, ordering medications, tests, or procedures, referring and communicating with other health care professionals , documenting information in the medical record, independently interpreting results and communicating that information with the patient/family/caregiver and care coordination  Follow Up   Return if symptoms worsen or fail to improve.  Patient was given instructions and counseling regarding her condition or  for health maintenance advice. Please see specific information pulled into the AVS if appropriate.

## 2023-01-27 NOTE — LETTER
January 27, 2023     Patient: Maryana Huber   YOB: 1970   Date of Visit: 1/27/2023       To Whom It May Concern:    It is my medical opinion that Maryana Huber may return to work 1/30/2023         Sincerely,        TERRELL Paris    CC: No Recipients

## 2023-02-06 ENCOUNTER — TELEMEDICINE (OUTPATIENT)
Dept: FAMILY MEDICINE CLINIC | Facility: TELEHEALTH | Age: 53
End: 2023-02-06
Payer: COMMERCIAL

## 2023-02-06 DIAGNOSIS — R05.9 COUGH, UNSPECIFIED TYPE: ICD-10-CM

## 2023-02-06 DIAGNOSIS — J02.9 PHARYNGITIS, UNSPECIFIED ETIOLOGY: Primary | ICD-10-CM

## 2023-02-06 PROBLEM — K59.00 CONSTIPATION: Status: RESOLVED | Noted: 2018-10-02 | Resolved: 2023-02-06

## 2023-02-06 PROBLEM — R11.0 NAUSEA: Status: RESOLVED | Noted: 2023-01-27 | Resolved: 2023-02-06

## 2023-02-06 PROCEDURE — 99213 OFFICE O/P EST LOW 20 MIN: CPT | Performed by: NURSE PRACTITIONER

## 2023-02-06 RX ORDER — METHYLPREDNISOLONE 4 MG/1
TABLET ORAL
Qty: 21 TABLET | Refills: 0 | Status: SHIPPED | OUTPATIENT
Start: 2023-02-06 | End: 2023-02-09

## 2023-02-06 RX ORDER — LORATADINE 10 MG/1
10 TABLET ORAL DAILY
Qty: 7 TABLET | Refills: 0 | Status: SHIPPED | OUTPATIENT
Start: 2023-02-06 | End: 2023-03-04

## 2023-02-06 RX ORDER — DEXTROMETHORPHAN HYDROBROMIDE AND PROMETHAZINE HYDROCHLORIDE 15; 6.25 MG/5ML; MG/5ML
5 SYRUP ORAL 4 TIMES DAILY PRN
Qty: 118 ML | Refills: 0 | Status: SHIPPED | OUTPATIENT
Start: 2023-02-06 | End: 2023-02-09

## 2023-02-06 RX ORDER — BENZONATATE 200 MG/1
200 CAPSULE ORAL 3 TIMES DAILY PRN
Qty: 18 CAPSULE | Refills: 0 | Status: SHIPPED | OUTPATIENT
Start: 2023-02-06 | End: 2023-02-24 | Stop reason: SDUPTHER

## 2023-02-06 NOTE — PROGRESS NOTES
Subjective   Chief Complaint   Patient presents with   • Sore Throat          • Earache          • Cough       Maryana Huber is a 52 y.o. female.     History of Present Illness  Patient reports cough, sore throat and congestion in her throat and upper airways for over 1 week.  Mucus is green.  She reports being seen for the symptoms a little over a week ago, she tested negative for strep throat and FLu and was prescribed a Z-Leo.  She finished the Z-Leo, symptoms improved a little bit but have now worsened again.  She does not feel like symptoms are deep in her chest most of her symptoms are localized in the throat.  Throat feels tight and swollen.  Cough comes in spells.  Her left ear also aches.  She is concerned about the possibility of mono due to associated fatigue and lymphadenopathy.  Sore Throat   This is a new problem. Episode onset: over 1 week. The problem has been unchanged. There has been no fever. Associated symptoms include congestion, coughing, ear pain and swollen glands. Pertinent negatives include no abdominal pain, diarrhea, drooling, ear discharge, headaches, hoarse voice, plugged ear sensation, neck pain, shortness of breath, trouble swallowing or vomiting. Treatments tried: azithromycin, flonase. The treatment provided no relief.   Earache   Associated symptoms include coughing and a sore throat. Pertinent negatives include no abdominal pain, diarrhea, ear discharge, headaches, neck pain or vomiting.   Cough  Associated symptoms include ear pain, postnasal drip and a sore throat. Pertinent negatives include no chest pain, chills, fever, headaches, myalgias, shortness of breath or wheezing.        Allergies   Allergen Reactions   • Clarithromycin GI Intolerance     Stomach cramps    • Levofloxacin Nausea And Vomiting   • Nitrofurantoin Monohyd Macro Nausea And Vomiting   • Trazodone And Nefazodone Confusion     NIGHTMARES   • Hydrocodone Unknown - Low Severity     Nausea   • Tetracycline Hcl  Unknown - Low Severity       Past Medical History:   Diagnosis Date   • Abdominal cramping 07/17/2017    SEEN AT [facility] ER   • Anemia    • Anxiety    • Arthralgia of left temporomandibular joint 06/2021   • Atypical chest pain 08/30/2015    SEEN AT [facility] ER   • Breast asymmetry    • Carpal tunnel syndrome, bilateral    • Cervical ca (HCC) 2010    S/P LEEP   • Chest pain on breathing 01/2019   • Chronic neck pain    • Constipation    • Cystitis 03/2019   • Depression    • Dysuria    • Fibrocystic breast    • GERD (gastroesophageal reflux disease)    • Hemorrhoids    • Hypertension    • Hypoglycemia 07/2012   • IBS (irritable bowel syndrome)    • Influenza 02/12/2018   • Left breast mass 12/17/2019   • Left-sided chest pain 12/2019   • Migraine    • Nodule of anus 10/2018   • Occipital headache 11/24/2017   • Pelvic pain    • Periodic headache syndrome 10/2019   • Right ankle sprain 08/30/2011    SEEN AT [facility] ER   • Rosacea    • Urgency of micturition    • Vitamin B 12 deficiency        Past Surgical History:   Procedure Laterality Date   • APPENDECTOMY N/A    • BREAST BIOPSY     • BREAST LUMPECTOMY      BENIGN   • COLONOSCOPY N/A 12/19/2011    ENTIRE COLON WNL, RESCOPE IN 5 YRS, DR. JAMAR BELL AT [facility]   • ENDOSCOPY N/A 06/14/2011    NORMAL ESOPHAGUS, NORMAL DUODENUM, A FEW BENIGN GASTRIC POLYPS, DR. DAVID SANCHEZ AT [facility]   • FACIAL LACERATIONS REPAIR Right 07/06/2011    RIGHT EYEBROWN, DONE AT [facility] ER   • LEEP N/A 08/16/2010    FOR SEVERE DYSPLASIA OF CERVIX, LARGE AREA OF NON UPTAKE IN THE POSTERIOR LIP OF THE CERVIX, AND A SMALL AREA ON THE ANTERIOR LIP OF THE CERVIX, DR. KAYLEIGH GHOTRA AT [facility]   • VAGINAL DELIVERY N/A 02/01/2001    DR. YESSENIA SHINE       Social History     Socioeconomic History   • Marital status:    Tobacco Use   • Smoking status: Never   • Smokeless tobacco: Never   Vaping Use   • Vaping Use: Never used   Substance and Sexual Activity   • Alcohol use: No   • Drug use: No   • Sexual activity: Yes      Partners: Male     Comment: .       Family History   Problem Relation Age of Onset   • Colon polyps Mother    • Colon polyps Father    • Cancer Maternal Grandmother    • Cancer Maternal Grandfather    • No Known Problems Son    • Colon cancer Maternal Aunt          Current Outpatient Medications:   •  Acetaminophen (TYLENOL 8 HOUR PO), Take  by mouth., Disp: , Rfl:   •  atorvastatin (Lipitor) 40 MG tablet, Take 1 tablet by mouth Every Night., Disp: 90 tablet, Rfl: 1  •  azithromycin (Zithromax Z-Leo) 250 MG tablet, Take 2 tablets by mouth on day 1, then 1 tablet daily on days 2-5, Disp: 6 tablet, Rfl: 0  •  benzonatate (TESSALON) 200 MG capsule, Take 1 capsule by mouth 3 (Three) Times a Day As Needed for Cough., Disp: 18 capsule, Rfl: 0  •  brompheniramine-pseudoephedrine-DM 30-2-10 MG/5ML syrup, Take 10 mL by mouth 4 (Four) Times a Day As Needed for Congestion, Cough or Allergies., Disp: 280 mL, Rfl: 0  •  clobetasol (TEMOVATE) 0.05 % external solution, , Disp: , Rfl:   •  esomeprazole (nexIUM) 40 MG capsule, 40 mg Daily., Disp: , Rfl:   •  fluticasone (FLONASE) 50 MCG/ACT nasal spray, 1 spray into the nostril(s) as directed by provider Daily., Disp: 18.2 mL, Rfl: 3  •  Hydrocortisone, Perianal, (Anusol-HC) 2.5 % rectal cream, Apply to hemorrhoids 3 times daily.  Include applicator.  Do not use for more than 7 days at a time., Disp: 30 g, Rfl: 1  •  ketoconazole (NIZORAL) 2 % shampoo, , Disp: , Rfl:   •  linaclotide (LINZESS) 72 MCG capsule capsule, Take 72 mcg by mouth Every Morning Before Breakfast., Disp: , Rfl:   •  loratadine (Claritin) 10 MG tablet, Take 1 tablet by mouth Daily for 7 days., Disp: 7 tablet, Rfl: 0  •  methylPREDNISolone (MEDROL) 4 MG dose pack, Take as directed on package instructions., Disp: 21 tablet, Rfl: 0  •  montelukast (Singulair) 10 MG tablet, Take 1 tablet by mouth Every Night., Disp: 90 tablet, Rfl: 1  •  neomycin-polymyxin-hydrocortisone (CORTISPORIN) 3.5-46667-3 otic  solution, Administer 3 drops into both ears 4 (Four) Times a Day., Disp: 10 mL, Rfl: 0  •  ondansetron (Zofran) 4 MG tablet, Take 1 tablet by mouth Every 8 (Eight) Hours As Needed for Nausea or Vomiting., Disp: 21 tablet, Rfl: 0  •  promethazine-dextromethorphan (PROMETHAZINE-DM) 6.25-15 MG/5ML syrup, Take 5 mL by mouth 4 (Four) Times a Day As Needed for Cough., Disp: 118 mL, Rfl: 0  •  Sodium Fluoride 5000 PPM 1.1 % paste, , Disp: , Rfl:   •  Soolantra 1 % cream, , Disp: , Rfl:   •  SUMAtriptan (Imitrex) 50 MG tablet, Take one tablet at onset of headache. May repeat dose one time in 2 hours if headache not relieved., Disp: 10 tablet, Rfl: 3  •  valACYclovir (Valtrex) 1000 MG tablet, Take 1 tabs every 12 hours x 10 day, Disp: 20 tablet, Rfl: 4  •  valsartan (DIOVAN) 160 MG tablet, Take 160 mg by mouth Daily., Disp: , Rfl:   •  vitamin D (ERGOCALCIFEROL) 1.25 MG (63962 UT) capsule capsule, Take 1 capsule by mouth 1 (One) Time Per Week., Disp: 16 capsule, Rfl: 3      Review of Systems   Constitutional: Positive for fatigue. Negative for chills, diaphoresis and fever.   HENT: Positive for congestion, ear pain, postnasal drip, sore throat and swollen glands. Negative for drooling, ear discharge, hoarse voice, sinus pressure and trouble swallowing.    Respiratory: Positive for cough. Negative for chest tightness, shortness of breath and wheezing.    Cardiovascular: Negative for chest pain and palpitations.   Gastrointestinal: Negative for abdominal pain, diarrhea and vomiting.   Musculoskeletal: Negative for myalgias and neck pain.   Neurological: Negative for headache.        There were no vitals filed for this visit.    Objective   Physical Exam  Constitutional:       General: She is not in acute distress.     Appearance: Normal appearance. She is not ill-appearing, toxic-appearing or diaphoretic.   HENT:      Head: Normocephalic.      Nose: Congestion present.      Right Sinus: No maxillary sinus tenderness or frontal  sinus tenderness.      Left Sinus: No maxillary sinus tenderness or frontal sinus tenderness.      Comments: Per pt       Mouth/Throat:      Lips: Pink.      Mouth: Mucous membranes are moist.   Pulmonary:      Effort: Pulmonary effort is normal.      Comments: Frequent coughing during exam  Lymphadenopathy:      Head:      Right side of head: Tonsillar adenopathy present.      Left side of head: Tonsillar adenopathy present.      Cervical: Cervical adenopathy present.      Comments: Per pt    Neurological:      Mental Status: She is alert and oriented to person, place, and time.   Psychiatric:         Mood and Affect: Mood normal.         Behavior: Behavior normal.          Procedures     Assessment & Plan   Diagnoses and all orders for this visit:    1. Pharyngitis, unspecified etiology (Primary)    2. Cough, unspecified type    Other orders  -     loratadine (Claritin) 10 MG tablet; Take 1 tablet by mouth Daily for 7 days.  Dispense: 7 tablet; Refill: 0  -     promethazine-dextromethorphan (PROMETHAZINE-DM) 6.25-15 MG/5ML syrup; Take 5 mL by mouth 4 (Four) Times a Day As Needed for Cough.  Dispense: 118 mL; Refill: 0  -     benzonatate (TESSALON) 200 MG capsule; Take 1 capsule by mouth 3 (Three) Times a Day As Needed for Cough.  Dispense: 18 capsule; Refill: 0  -     methylPREDNISolone (MEDROL) 4 MG dose pack; Take as directed on package instructions.  Dispense: 21 tablet; Refill: 0        Results for orders placed or performed in visit on 01/27/23   POCT SARS-CoV-2 Antigen BON + Flu    Specimen: Swab   Result Value Ref Range    SARS Antigen Presumptive Negative Not Detected, Presumptive Negative    Influenza A Antigen BON Not Detected Not Detected    Influenza B Antigen BON Not Detected Not Detected    Internal Control Passed Passed    Lot Number [id number]     Expiration Date 3,232,024    POCT rapid strep A    Specimen: Swab   Result Value Ref Range    Rapid Strep A Screen Negative Negative, VALID, INVALID, Not  Performed    Internal Control Passed Passed    Lot Number [id number]     Expiration Date 9,072,024        PLAN: Advised patient to follow-up with PCP for mono testing if symptoms do not improve with current treatment.  Discussed dosing, side effects, recommended other symptomatic care.  Patient should follow up with primary care provider, Urgent Care or ER if symptoms worsen, fail to resolve or other symptoms need attention. Patient/family agree to the above.         TERRELL Guidry     The use of a video visit has been reviewed with the patient and verbal informed consent has been obtained. Myself and Maryana Huber participated in this visit. The patient is located at [address] [place] [address]. I am located in [place], [place]. Mychart and Zoom were utilized.        This visit was performed via Telehealth.  This patient has been instructed to follow-up with their primary care provider if their symptoms worsen or the treatment provided does not resolve their illness.

## 2023-02-08 ENCOUNTER — TELEPHONE (OUTPATIENT)
Dept: FAMILY MEDICINE CLINIC | Facility: CLINIC | Age: 53
End: 2023-02-08

## 2023-02-08 DIAGNOSIS — J06.9 UPPER RESPIRATORY TRACT INFECTION, UNSPECIFIED TYPE: ICD-10-CM

## 2023-02-08 DIAGNOSIS — J01.90 ACUTE NON-RECURRENT SINUSITIS, UNSPECIFIED LOCATION: ICD-10-CM

## 2023-02-08 RX ORDER — BROMPHENIRAMINE MALEATE, PSEUDOEPHEDRINE HYDROCHLORIDE, AND DEXTROMETHORPHAN HYDROBROMIDE 2; 30; 10 MG/5ML; MG/5ML; MG/5ML
10 SYRUP ORAL 4 TIMES DAILY PRN
Qty: 280 ML | Refills: 3 | Status: SHIPPED | OUTPATIENT
Start: 2023-02-08 | End: 2023-02-24

## 2023-02-08 NOTE — TELEPHONE ENCOUNTER
Caller: Maryana Huber    Relationship: Self    Best call back number: [phone]    What medication are you requesting: BROMPHEN-PSE-DM    What are your current symptoms: COUGH,, CHEST CONGESTION     How long have you been experiencing symptoms: OVER A WEEK     Have you had these symptoms before:    [x] Yes  [] No    Have you been treated for these symptoms before:   [x] Yes  [] No    If a prescription is needed, what is your preferred pharmacy and phone number: [facility] [id number] - [facility] [address] AT [facility] [address] [facility] [phone] [facility] [phone] FX     Additional notes:  PATIENT WOULD LIKE FOR THIS MEDICATION TO BE CALLED IN FOR HER SYMPTOMS. PATIENT STATES SHE RECEIVE A  COUGH MEDICATION YESTERDAY BUT IT MAKES HER FALL ASLEEP SO SHE WOULD LIKE TO GET ANOTHER MEDICATION     PLEASE CALL AND ADVISE

## 2023-02-09 ENCOUNTER — HOSPITAL ENCOUNTER (OUTPATIENT)
Dept: GENERAL RADIOLOGY | Facility: HOSPITAL | Age: 53
Discharge: HOME | End: 2023-02-09
Admitting: NURSE PRACTITIONER
Payer: COMMERCIAL

## 2023-02-09 ENCOUNTER — OFFICE VISIT (OUTPATIENT)
Dept: FAMILY MEDICINE CLINIC | Facility: CLINIC | Age: 53
End: 2023-02-09
Payer: COMMERCIAL

## 2023-02-09 VITALS
HEIGHT: 66 IN | OXYGEN SATURATION: 96 % | BODY MASS INDEX: 29.41 KG/M2 | HEART RATE: 96 BPM | DIASTOLIC BLOOD PRESSURE: 100 MMHG | WEIGHT: 183 LBS | SYSTOLIC BLOOD PRESSURE: 150 MMHG | TEMPERATURE: 97.7 F

## 2023-02-09 DIAGNOSIS — R09.89 CHEST CONGESTION: ICD-10-CM

## 2023-02-09 DIAGNOSIS — R06.09 DYSPNEA ON EXERTION: ICD-10-CM

## 2023-02-09 DIAGNOSIS — J40 BRONCHITIS: Primary | ICD-10-CM

## 2023-02-09 LAB
EXPIRATION DATE: NORMAL
FLUAV AG UPPER RESP QL IA.RAPID: NOT DETECTED
FLUBV AG UPPER RESP QL IA.RAPID: NOT DETECTED
INTERNAL CONTROL: NORMAL
Lab: NORMAL
SARS-COV-2 AG UPPER RESP QL IA.RAPID: NOT DETECTED

## 2023-02-09 PROCEDURE — 87428 SARSCOV & INF VIR A&B AG IA: CPT | Performed by: NURSE PRACTITIONER

## 2023-02-09 PROCEDURE — 99214 OFFICE O/P EST MOD 30 MIN: CPT | Performed by: NURSE PRACTITIONER

## 2023-02-09 PROCEDURE — 71046 X-RAY EXAM CHEST 2 VIEWS: CPT

## 2023-02-09 RX ORDER — DOXYCYCLINE HYCLATE 100 MG/1
100 CAPSULE ORAL 2 TIMES DAILY
Qty: 14 CAPSULE | Refills: 0 | Status: SHIPPED | OUTPATIENT
Start: 2023-02-09

## 2023-02-09 RX ORDER — ALBUTEROL SULFATE 90 UG/1
2 AEROSOL, METERED RESPIRATORY (INHALATION) EVERY 4 HOURS PRN
Qty: 8 G | Refills: 0 | Status: SHIPPED | OUTPATIENT
Start: 2023-02-09

## 2023-02-09 RX ORDER — PREDNISONE 10 MG/1
10 TABLET ORAL DAILY
Qty: 3 TABLET | Refills: 0 | Status: SHIPPED | OUTPATIENT
Start: 2023-02-09 | End: 2023-02-24

## 2023-02-09 NOTE — PROGRESS NOTES
"Chief Complaint  Cough, congestion, and Shortness of Breath    Dora Huber presents to [facility] PRIMARY [facility]ARE  History of Present Illness   53-year-old female presenting with complaints of chest congestion, cough for several weeks. She had urgent care encounter via telehealth on 2/6/2023, was given Z-Leo, Medrol Dosepak and Bromfed but reports increased shortness of breath with exertion over the last several days. She denies fever, CP, N/V/D or known COVID exposure.  COVID and influenza negative today, will order chest x-ray PA lateral , CBC and start doxycycline.  She also reports was on week 4 of her Medrol dose pack but misplaced and thinks she accidentally threw away, I will add prednisone 10 mg daily for 3 days and albuterol as needed SOA.  Her blood pressure is elevated but did not take medication yesterday or today, reports BP normally runs 110-120/70s.     Objective   Vital Signs:  /100 (BP Location: Left arm, Patient Position: Sitting, Cuff Size: Adult)   Pulse 96   Temp 97.7 °F (36.5 °C) (Infrared)   Ht 167.6 cm (66\")   Wt 83 kg (183 lb)   SpO2 96%   BMI 29.54 kg/m²   Estimated body mass index is 29.54 kg/m² as calculated from the following:    Height as of this encounter: 167.6 cm (66\").    Weight as of this encounter: 83 kg (183 lb).             Physical Exam  Cardiovascular:      Rate and Rhythm: Normal rate.      Pulses: Normal pulses.      Heart sounds: Normal heart sounds.   Pulmonary:      Effort: Pulmonary effort is normal.      Breath sounds: Normal breath sounds. No wheezing, rhonchi or rales.      Comments: Chest congestion with cough  Neurological:      General: No focal deficit present.      Mental Status: She is alert and oriented to person, place, and time.   Psychiatric:         Mood and Affect: Mood normal.         Behavior: Behavior normal.        Result Review :                   Assessment and Plan   Diagnoses and all orders for this " visit:    1. Bronchitis (Primary)  -     doxycycline (VIBRAMYCIN) 100 MG capsule; Take 1 capsule by mouth 2 (Two) Times a Day.  Dispense: 14 capsule; Refill: 0  -     predniSONE (DELTASONE) 10 MG tablet; Take 1 tablet by mouth Daily.  Dispense: 3 tablet; Refill: 0    2. Chest congestion  -     XR Chest PA & Lateral; Future  -     POCT SARS-CoV-2 Antigen BON + Flu  -     albuterol sulfate  (90 Base) MCG/ACT inhaler; Inhale 2 puffs Every 4 (Four) Hours As Needed for Wheezing.  Dispense: 8 g; Refill: 0  -     CBC & Differential; Future  -     doxycycline (VIBRAMYCIN) 100 MG capsule; Take 1 capsule by mouth 2 (Two) Times a Day.  Dispense: 14 capsule; Refill: 0  -     predniSONE (DELTASONE) 10 MG tablet; Take 1 tablet by mouth Daily.  Dispense: 3 tablet; Refill: 0    3. Dyspnea on exertion  -     XR Chest PA & Lateral; Future  -     POCT SARS-CoV-2 Antigen BON + Flu  -     albuterol sulfate  (90 Base) MCG/ACT inhaler; Inhale 2 puffs Every 4 (Four) Hours As Needed for Wheezing.  Dispense: 8 g; Refill: 0  -     CBC & Differential; Future           I spent 30 minutes caring for Maryana on this date of service. This time includes time spent by me in the following activities:reviewing tests, obtaining and/or reviewing a separately obtained history, performing a medically appropriate examination and/or evaluation , counseling and educating the patient/family/caregiver, ordering medications, tests, or procedures and documenting information in the medical record  Follow Up   Return if symptoms worsen or fail to improve.  Patient was given instructions and counseling regarding her condition or for health maintenance advice. Please see specific information pulled into the AVS if appropriate.     Follow-up if symptoms not improved.  Seek attention via ED if worsening shortness of breath or chest pain.    Mask and gloves worn.  Did not enter room until results of COVID and influenza.

## 2023-02-09 NOTE — PATIENT INSTRUCTIONS
Follow-up if symptoms not improved.  Seek attention via ED if worsening shortness of breath or chest pain.

## 2023-02-10 DIAGNOSIS — R09.89 CHEST CONGESTION: ICD-10-CM

## 2023-02-10 DIAGNOSIS — R06.09 DYSPNEA ON EXERTION: ICD-10-CM

## 2023-02-11 LAB
BASOPHILS # BLD AUTO: 0 X10E3/UL (ref 0–0.2)
BASOPHILS NFR BLD AUTO: 0 %
EOSINOPHIL # BLD AUTO: 0.1 X10E3/UL (ref 0–0.4)
EOSINOPHIL NFR BLD AUTO: 1 %
ERYTHROCYTE [DISTWIDTH] IN BLOOD BY AUTOMATED COUNT: 12.7 % (ref 11.7–15.4)
HCT VFR BLD AUTO: 34.4 % (ref 34–46.6)
HGB BLD-MCNC: 11.5 G/DL (ref 11.1–15.9)
IMM GRANULOCYTES # BLD AUTO: 0 X10E3/UL (ref 0–0.1)
IMM GRANULOCYTES NFR BLD AUTO: 0 %
LYMPHOCYTES # BLD AUTO: 2.5 X10E3/UL (ref 0.7–3.1)
LYMPHOCYTES NFR BLD AUTO: 31 %
MCH RBC QN AUTO: 29.3 PG (ref 26.6–33)
MCHC RBC AUTO-ENTMCNC: 33.4 G/DL (ref 31.5–35.7)
MCV RBC AUTO: 88 FL (ref 79–97)
MONOCYTES # BLD AUTO: 0.6 X10E3/UL (ref 0.1–0.9)
MONOCYTES NFR BLD AUTO: 8 %
NEUTROPHILS # BLD AUTO: 4.9 X10E3/UL (ref 1.4–7)
NEUTROPHILS NFR BLD AUTO: 60 %
PLATELET # BLD AUTO: 376 X10E3/UL (ref 150–450)
RBC # BLD AUTO: 3.92 X10E6/UL (ref 3.77–5.28)
WBC # BLD AUTO: 8.2 X10E3/UL (ref 3.4–10.8)

## 2023-02-14 ENCOUNTER — TELEPHONE (OUTPATIENT)
Dept: FAMILY MEDICINE CLINIC | Facility: CLINIC | Age: 53
End: 2023-02-14
Payer: COMMERCIAL

## 2023-02-14 RX ORDER — FLUCONAZOLE 150 MG/1
TABLET ORAL
Qty: 3 TABLET | Refills: 0 | Status: SHIPPED | OUTPATIENT
Start: 2023-02-14 | End: 2023-02-24

## 2023-02-14 NOTE — TELEPHONE ENCOUNTER
Caller: Maryana Huber    Relationship: Self    Best call back number: [phone]    What medication are you requesting:DIFLUCAN     What are your current symptoms: YEAST INFECTION FROM ANTIBIOTICS     If a prescription is needed, what is your preferred pharmacy and phone number: [facility] [id number] - [place], [place] - [address] AT [facility] [address] [facility] [phone] [facility] [phone] FX     Additional notes:

## 2023-02-23 ENCOUNTER — TELEPHONE (OUTPATIENT)
Dept: FAMILY MEDICINE CLINIC | Facility: CLINIC | Age: 53
End: 2023-02-23

## 2023-02-23 NOTE — TELEPHONE ENCOUNTER
Caller: Maryana Huber    Relationship: Self    Best call back number: [phone]    What medication are you requesting: DOXYCYCLINE    What are your current symptoms: SYMPTOMS HAVE RETURNED TWO DAYS AFTER THIS MEDICATION WAS FINISHED-COUGH, CONGESTION, ALL IN HER THROAT.    How long have you been experiencing symptoms: 2-3 WEEKS    Have you had these symptoms before:    [x] Yes  [] No    Have you been treated for these symptoms before:   [x] Yes  [] No    If a prescription is needed, what is your preferred pharmacy and phone number: [facility] [id number] - [facility] [address] AT [facility] [address] [facility] - [phone] [facility] [phone] FX     Additional notes: PLEASE ADVISE

## 2023-02-24 ENCOUNTER — OFFICE VISIT (OUTPATIENT)
Dept: FAMILY MEDICINE CLINIC | Facility: CLINIC | Age: 53
End: 2023-02-24
Payer: COMMERCIAL

## 2023-02-24 VITALS
OXYGEN SATURATION: 98 % | HEART RATE: 94 BPM | SYSTOLIC BLOOD PRESSURE: 130 MMHG | DIASTOLIC BLOOD PRESSURE: 80 MMHG | BODY MASS INDEX: 29.09 KG/M2 | WEIGHT: 181 LBS | TEMPERATURE: 98.6 F | RESPIRATION RATE: 18 BRPM | HEIGHT: 66 IN

## 2023-02-24 DIAGNOSIS — J30.1 SEASONAL ALLERGIC RHINITIS DUE TO POLLEN: Primary | ICD-10-CM

## 2023-02-24 DIAGNOSIS — R05.1 ACUTE COUGH: ICD-10-CM

## 2023-02-24 PROCEDURE — 99213 OFFICE O/P EST LOW 20 MIN: CPT

## 2023-02-24 RX ORDER — BENZONATATE 200 MG/1
200 CAPSULE ORAL 3 TIMES DAILY PRN
Qty: 18 CAPSULE | Refills: 0 | Status: SHIPPED | OUTPATIENT
Start: 2023-02-24

## 2023-02-24 RX ORDER — GUAIFENESIN AND DEXTROMETHORPHAN HYDROBROMIDE 600; 30 MG/1; MG/1
2 TABLET, EXTENDED RELEASE ORAL 2 TIMES DAILY
Qty: 2 TABLET | Refills: 0 | COMMUNITY
Start: 2023-02-24

## 2023-02-24 RX ORDER — METHYLPREDNISOLONE 4 MG/1
TABLET ORAL
Qty: 21 TABLET | Refills: 0 | Status: SHIPPED | OUTPATIENT
Start: 2023-02-24

## 2023-02-24 NOTE — PATIENT INSTRUCTIONS
Take the following medications for symptom relief:  AM: take Allegra, Mucinex-DM 1200-60mg with full glass of water  PM: Singulair, Mucinex-DM 1200-60mg  with full glass of water, and Flonase 2 squirts each nostril nightly    You may take all these medications along with tessalon perles. Do not exceed 600mg in 24 hours period.    Once symptoms have subsided, stay on allegra and flonase until end of allergy season.

## 2023-02-24 NOTE — PROGRESS NOTES
"Chief Complaint  Bronchitis (Seen Daija in Jan and Becki in Feb finished Doxycycline 2 days ago now cough and congestion returned)    Subjective        Maryana Huber presents to [facility]  History of Present Illness  Patient is a 53-year-old female, patient of Dr. Jarvis.  Patient last saw Becki Chopra in office on 2/9/2023 and diagnosed with bronchitis and given prescription for prednisone 10 mg tablet for 3 days and doxycycline 100 mg 2 times daily for 7 days.  Patient had unremarkable chest x-ray on 2/9/2023.  Patient also seen on 1/27/2023 for sore throat and seasonal allergies and started on singular 10 mg tablet nightly.  Today, patient presents with congestion she feels in neck due to mucus and postnasal drip.  Patient denies nasal pressure/congestion, shortness of air, or chest pain.    Objective   Vital Signs:  /80 (BP Location: Left arm, Patient Position: Sitting, Cuff Size: Adult)   Pulse 94   Temp 98.6 °F (37 °C) (Infrared)   Resp 18   Ht 167.6 cm (66\")   Wt 82.1 kg (181 lb)   SpO2 98%   BMI 29.21 kg/m²   Estimated body mass index is 29.21 kg/m² as calculated from the following:    Height as of this encounter: 167.6 cm (66\").    Weight as of this encounter: 82.1 kg (181 lb).             Physical Exam  Constitutional:       General: She is awake.      Appearance: Normal appearance.   HENT:      Head: Normocephalic and atraumatic.      Right Ear: Tympanic membrane normal.      Left Ear: Tympanic membrane normal.      Ears:      Comments: Bilateral ear canals red, no infection noted.       Nose: Rhinorrhea present. Rhinorrhea is clear.      Right Turbinates: Not enlarged or swollen.      Left Turbinates: Not enlarged or swollen.      Right Sinus: No maxillary sinus tenderness or frontal sinus tenderness.      Left Sinus: No maxillary sinus tenderness or frontal sinus tenderness.      Mouth/Throat:      Comments: Small red patches to posterior pharynx, no " exudate or infection noted.  Eyes:      Extraocular Movements: Extraocular movements intact.      Conjunctiva/sclera: Conjunctivae normal.      Pupils: Pupils are equal, round, and reactive to light.   Cardiovascular:      Rate and Rhythm: Normal rate and regular rhythm.      Pulses: Normal pulses.      Heart sounds: Normal heart sounds.   Pulmonary:      Effort: Pulmonary effort is normal.      Breath sounds: Normal breath sounds and air entry.   Skin:     General: Skin is warm and dry.   Neurological:      General: No focal deficit present.      Mental Status: She is alert and oriented to person, place, and time. Mental status is at baseline.   Psychiatric:         Attention and Perception: Attention normal.         Behavior: Behavior normal. Behavior is cooperative.        Result Review :                   Assessment and Plan   Diagnoses and all orders for this visit:    1. Seasonal allergic rhinitis due to pollen (Primary)  -     methylPREDNISolone (MEDROL) 4 MG dose pack; Take as directed on package instructions.  Dispense: 21 tablet; Refill: 0  -     guaifenesin-dextromethorphan (MUCINEX DM)  MG tablet sustained-release 12 hour tablet; Take 2 tablets by mouth 2 (Two) Times a Day.  Dispense: 2 tablet; Refill: 0    2. Acute cough  -     benzonatate (TESSALON) 200 MG capsule; Take 1 capsule by mouth 3 (Three) Times a Day As Needed for Cough.  Dispense: 18 capsule; Refill: 0  -     guaifenesin-dextromethorphan (MUCINEX DM)  MG tablet sustained-release 12 hour tablet; Take 2 tablets by mouth 2 (Two) Times a Day.  Dispense: 2 tablet; Refill: 0    Take the following medications for symptom relief:  • AM: take Allegra, Mucinex-DM 1200-60mg with full glass of water  • PM: Singulair, Mucinex-DM 1200-60mg  with full glass of water, and Flonase 2 squirts each nostril nightly    You may take all these medications along with tessalon perles. Do not exceed 600mg in 24 hours period.    Once symptoms have subsided,  stay on allegra and flonase until end of allergy season.         Follow Up   Return if symptoms worsen or fail to improve.  Patient was given instructions and counseling regarding her condition or for health maintenance advice. Please see specific information pulled into the AVS if appropriate.

## 2023-03-06 RX ORDER — FLUTICASONE PROPIONATE 50 MCG
SPRAY, SUSPENSION (ML) NASAL
Qty: 16 ML | Refills: 1 | Status: SHIPPED | OUTPATIENT
Start: 2023-03-06

## 2023-03-10 ENCOUNTER — TELEPHONE (OUTPATIENT)
Dept: GASTROENTEROLOGY | Facility: CLINIC | Age: 53
End: 2023-03-10

## 2023-03-10 NOTE — TELEPHONE ENCOUNTER
Hub staff attempted to follow warm transfer process and was unsuccessful     Caller: SABRINA    Relationship to patient:     Best call back number:     Patient is needing: PRE AUTH THAT WAS SUBMITTED LAST YEAR FOR CT SCAN. NEEDS RESUBMITTED WITH MEDICAL RECORDS SHOWING THAT THE CT SCAN WAS MEDICALLY NECESSARY. THEY CODE THAT WAS SUBMITTED WAS 95582. PLEASE CALL [phone] FOR PEER TO PEER REVIEW.

## 2023-03-29 ENCOUNTER — TELEPHONE (OUTPATIENT)
Dept: FAMILY MEDICINE CLINIC | Facility: CLINIC | Age: 53
End: 2023-03-29
Payer: COMMERCIAL

## 2023-03-29 RX ORDER — LORAZEPAM 0.5 MG/1
0.5 TABLET ORAL EVERY 8 HOURS PRN
Qty: 6 TABLET | Refills: 0 | Status: SHIPPED | OUTPATIENT
Start: 2023-03-29

## 2023-03-29 NOTE — TELEPHONE ENCOUNTER
Caller: Maryana Huber    Relationship: Self    Best call back number: [phone]     What medication are you requesting: XANAX OR ALTERNATIVE    What are your current symptoms: GRIEF / DEPRESSION DUE TO LOSS OF FAMILY DOG    How long have you been experiencing symptoms: 03/26/23    Have you had these symptoms before:    [] Yes  [x] No    Have you been treated for these symptoms before:   [] Yes  [x] No    If a prescription is needed, what is your preferred pharmacy and phone number: [facility] [id number] - [facility] [address] AT [facility] [address] [facility] [phone] [facility] [phone] FX     Additional notes: PATIENT WOULD LIKE IT TO BE KNOWN THAT SHE IS NOT ASKING FOR A FULL PRESCRIPTION, BUT A FEW DAYS WORTH TO HELP HER THROUGH HER WORK DAYS.

## 2023-04-14 ENCOUNTER — TELEPHONE (OUTPATIENT)
Dept: FAMILY MEDICINE CLINIC | Facility: CLINIC | Age: 53
End: 2023-04-14
Payer: COMMERCIAL

## 2023-04-14 NOTE — TELEPHONE ENCOUNTER
Derm worried about interaction with Valsartan. Wants your input. In your opinion, is this okay to take with Spironolactone? She plans on starting on low dose.

## 2023-04-14 NOTE — TELEPHONE ENCOUNTER
I am afraid the dermatologist will have to be contacted.  I do not want to interfere with another doctor's medication

## 2023-04-14 NOTE — TELEPHONE ENCOUNTER
Caller: Maryana Huber    Relationship: Self    Best call back number: [phone]    What medication are you requesting: SPIRONOLACTONE    If a prescription is needed, what is your preferred pharmacy and phone number: [facility] [id number] - [place], [place] - [address] AT [facility] [address] [facility] [phone] [facility] [phone] FX     Additional notes: PATIENT WAS RECOMMENDED BY HER DERMATOLOGIST DR. SHAMAR WOLFE TO TRY SPIRONOLACTONE ON A LOW DOSE. PATIENT'S PHARMACY IS ASKING FOR DR. BURRIS'S APPROVAL DUE TO POTENTIAL DRUG-DRUG INTERACTIONS AFFECTING PATIENT'S BLOOD PRESSURE WITH HER OTHER MEDICATIONS.    PLEASE CALL PATIENT TO ADVISE IF NEEDED, OR CALL [facility] WITH APPROVAL.

## 2023-04-17 NOTE — TELEPHONE ENCOUNTER
Caller: Artem Huberlon MARTINEZ    Relationship: Self    Best call back number: [phone]    Requested Prescriptions:   Requested Prescriptions     Pending Prescriptions Disp Refills   • valsartan (DIOVAN) 160 MG tablet       Sig: Take 1 tablet by mouth Daily.   • linaclotide (LINZESS) 72 MCG capsule capsule 30 capsule      Sig: Take 1 capsule by mouth Every Morning Before Breakfast.        Pharmacy where request should be sent: [facility] [id number] [facility] [address] AT [facility] [phone] [facility] [phone]      Last office visit with prescribing clinician: 8/5/2022   Last telemedicine visit with prescribing clinician: Visit date not found   Next office visit with prescribing clinician: Visit date not found     Additional details provided by patient: SHE IS OUT OF THE VALSARTAN    Does the patient have less than a 3 day supply:  [x] Yes  [] No    Would you like a call back once the refill request has been completed: [] Yes [x] No    If the office needs to give you a call back, can they leave a voicemail: [] Yes [x] No    Zahira Encarnacion Rep   04/17/23 10:40 EDT

## 2023-04-18 RX ORDER — VALSARTAN 160 MG/1
160 TABLET ORAL DAILY
Qty: 90 TABLET | Refills: 1 | Status: SHIPPED | OUTPATIENT
Start: 2023-04-18

## 2023-04-21 ENCOUNTER — TELEPHONE (OUTPATIENT)
Dept: GASTROENTEROLOGY | Facility: CLINIC | Age: 53
End: 2023-04-21
Payer: COMMERCIAL

## 2023-04-21 NOTE — TELEPHONE ENCOUNTER
Jerri Ernandez (E)  Peggy Vogt ([facility])  [employer]  From: Vinita Ma (S) <[email or web address]>  Sent:  2:38:41 PM  To: Jerri Ernandez (GARFIELD) <[email or web address]>  Subject: EPIC ACCT # [id number] HARNESS FLY MARTINEZ PT MRN# [id number] PT : 1970 PT GUAR# [id number]       Good afternoon,    I had the above patients spouse, Edwardo contact the SBO in regards to his bill at [employer] for DOS 2022, he stated he knew we didn’t do their billing but when he contacted them they said the physician’s office would have to correct.  It looks like we adjusted our billing for this, pt states he was advised insurance denied for no auth as well as not medically necessary, which I did see where records were sent in, I read thru all of our notes and contacted Eufemia @ [employer] as well to discuss acct, she stated the last note she has on the acct is from 03/10/23 when Chloe with [employer] was contacting the physician’s office to get what they needed to process the [employer] bill.  Can you please help, spouse Edwardo’s contact # is [phone].  Thank you!                          Spoke with pt  regarding below msg he is going to fax me the denial letter and some other documents that might help to get this directed in the right direction.    [place] [address]    Phone: [phone]    Fax: [phone]    Email: [email or web address]    [facility]

## 2023-05-22 ENCOUNTER — TELEPHONE (OUTPATIENT)
Dept: GASTROENTEROLOGY | Facility: CLINIC | Age: 53
End: 2023-05-22

## 2023-05-22 NOTE — TELEPHONE ENCOUNTER
DELETE AFTER REVIEWING: Telephone encounter to be sent to the clinical pool   Hub staff attempted to follow warm transfer process and was unsuccessful     Caller: LUCIA ANDREWS    Relationship to patient: SPOUSE    Best call back number: [phone]    Patient is needing:  Edwardo, spouse of patient Maryana Andrews,contact the SBO in regards to his bill at [employer] for DOS 04/12/2022, he stated he knew we didn’t do their billing but has made contact with the physician’s office to have the med recs sent.  Mr. Andrews states he was advised insurance denied for no auth as well as not medically necessary.  [employer] still has an outstanding bill that Mr. Andrews thinks should be taken care of if we get the med recs sent to his insurance. His wife is also an employee of . Mr. Andrews has stated his frustration that he hasn't heard anything and it's been a month since he emailed over all of the documentaiton.  Can you please help, Edwardo’s contact # is [phone].  Thank you!

## 2023-06-07 ENCOUNTER — TELEPHONE (OUTPATIENT)
Dept: GASTROENTEROLOGY | Facility: CLINIC | Age: 53
End: 2023-06-07
Payer: COMMERCIAL

## 2023-06-07 NOTE — TELEPHONE ENCOUNTER
CALLER: Lenin Huber    RELATIONSHIP TO PATIENT: Spouse     BEST CALL BACK NUMBER: [phone]  Can call anytime    CALL REGARDING: Need to Speak with Ms. Peggy and if not possible need to speak to Manager     medical records and reasoning to why CT of abdomin and pelvis was needed  from Dr. Pettit was not sent to [employer] for the date of service 4/12/2022 at [facility] [employer] [facility]. This is needed to be approved by [place] so that [facility] can be paid    Patient Request a Call Back

## 2023-06-07 NOTE — TELEPHONE ENCOUNTER
Spoke with pt  regarding denied appeal of ct abd pelvis for pt.  Received in email from him the appeal  denial letter and spoke with Lyudmila at [phone] she requested we fax appeal documentation to [employer]. [address] [address] [facility] [address]--loubjx

## 2023-08-10 ENCOUNTER — TRANSCRIBE ORDERS (OUTPATIENT)
Dept: ADMINISTRATIVE | Facility: HOSPITAL | Age: 53
End: 2023-08-10
Payer: COMMERCIAL

## 2023-08-10 DIAGNOSIS — R10.2 PELVIC PAIN: Primary | ICD-10-CM

## 2023-08-16 ENCOUNTER — HOSPITAL ENCOUNTER (OUTPATIENT)
Facility: HOSPITAL | Age: 53
Discharge: HOME | End: 2023-08-16
Attending: OBSTETRICS & GYNECOLOGY | Admitting: OBSTETRICS & GYNECOLOGY
Payer: COMMERCIAL

## 2023-08-16 DIAGNOSIS — R10.2 PELVIC PAIN: ICD-10-CM

## 2023-08-16 PROCEDURE — 76830 TRANSVAGINAL US NON-OB: CPT

## 2023-08-16 PROCEDURE — 76856 US EXAM PELVIC COMPLETE: CPT

## 2023-08-16 PROCEDURE — 93976 VASCULAR STUDY: CPT

## 2023-09-26 DIAGNOSIS — J30.2 SEASONAL ALLERGIES: ICD-10-CM

## 2023-09-26 RX ORDER — SUMATRIPTAN 50 MG/1
TABLET, FILM COATED ORAL
Qty: 10 TABLET | Refills: 3 | Status: SHIPPED | OUTPATIENT
Start: 2023-09-26

## 2023-09-26 RX ORDER — VALSARTAN 160 MG/1
160 TABLET ORAL DAILY
Qty: 90 TABLET | Refills: 1 | Status: SHIPPED | OUTPATIENT
Start: 2023-09-26

## 2023-09-26 RX ORDER — MONTELUKAST SODIUM 10 MG/1
10 TABLET ORAL NIGHTLY
Qty: 90 TABLET | Refills: 1 | Status: SHIPPED | OUTPATIENT
Start: 2023-09-26

## 2023-09-26 NOTE — TELEPHONE ENCOUNTER
Caller: Maryana Huber    Relationship: Self    Best call back number: [phone]     Requested Prescriptions:   Requested Prescriptions     Pending Prescriptions Disp Refills    valsartan (DIOVAN) 160 MG tablet 90 tablet 1     Sig: Take 1 tablet by mouth Daily.    montelukast (Singulair) 10 MG tablet 90 tablet 1     Sig: Take 1 tablet by mouth Every Night.    SUMAtriptan (Imitrex) 50 MG tablet 10 tablet 3     Sig: Take one tablet at onset of headache. May repeat dose one time in 2 hours if headache not relieved.    linaclotide (LINZESS) 72 MCG capsule capsule 30 capsule 3     Sig: Take 1 capsule by mouth Every Morning Before Breakfast.        Pharmacy where request should be sent: [facility] [id number] - [facility] [address] [facility] [phone] [facility] [phone]      Last office visit with prescribing clinician: Visit date not found   Last telemedicine visit with prescribing clinician: Visit date not found   Next office visit with prescribing clinician: 11/16/2023     Additional details provided by patient: PATIENT STATES THAT SHE TOOK HER LAST VALSARTAN TABLET TODAY.  OFFBOARDING DR. GUILLERMINA BURRIS AND PATIENT REQUESTS REFILLS UNTIL HER NEW PATIENT APPOINTMENT WITH DR. GRACIELA HOOD ON 11/16/23.    Does the patient have less than a 3 day supply:  [x] Yes  [] No    Would you like a call back once the refill request has been completed: [x] Yes [] No    If the office needs to give you a call back, can they leave a voicemail: [x] Yes [] No    PLEASE ADVISE.    Zahira Gao   09/26/23 09:59 EDT         DELETE AFTER READING TO PATIENT: “Thank you for sharing this information with me. I will send a message to the clinical team. Please allow 48 hours for the clinical staff to follow up on this request.”

## 2023-10-07 ENCOUNTER — HOSPITAL ENCOUNTER (EMERGENCY)
Facility: HOSPITAL | Age: 53
Discharge: HOME | End: 2023-10-07
Attending: EMERGENCY MEDICINE | Admitting: EMERGENCY MEDICINE
Payer: COMMERCIAL

## 2023-10-07 ENCOUNTER — APPOINTMENT (OUTPATIENT)
Dept: GENERAL RADIOLOGY | Facility: HOSPITAL | Age: 53
End: 2023-10-07
Payer: COMMERCIAL

## 2023-10-07 VITALS
WEIGHT: 158 LBS | DIASTOLIC BLOOD PRESSURE: 72 MMHG | BODY MASS INDEX: 25.39 KG/M2 | OXYGEN SATURATION: 100 % | SYSTOLIC BLOOD PRESSURE: 126 MMHG | HEIGHT: 66 IN | HEART RATE: 80 BPM | TEMPERATURE: 98.4 F | RESPIRATION RATE: 16 BRPM

## 2023-10-07 DIAGNOSIS — S43.52XA SPRAIN OF ACROMIOCLAVICULAR JOINT, LEFT, INITIAL ENCOUNTER: Primary | ICD-10-CM

## 2023-10-07 DIAGNOSIS — S40.012A CONTUSION OF LEFT SHOULDER, INITIAL ENCOUNTER: ICD-10-CM

## 2023-10-07 PROCEDURE — 73130 X-RAY EXAM OF HAND: CPT

## 2023-10-07 PROCEDURE — 99283 EMERGENCY DEPT VISIT LOW MDM: CPT

## 2023-10-07 PROCEDURE — 73030 X-RAY EXAM OF SHOULDER: CPT

## 2023-10-07 PROCEDURE — 73060 X-RAY EXAM OF HUMERUS: CPT

## 2023-10-07 RX ORDER — HYDROCODONE BITARTRATE AND ACETAMINOPHEN 5; 325 MG/1; MG/1
1 TABLET ORAL ONCE AS NEEDED
Status: DISCONTINUED | OUTPATIENT
Start: 2023-10-07 | End: 2023-10-08 | Stop reason: HOSPADM

## 2023-10-07 RX ADMIN — HYDROCODONE BITARTRATE AND ACETAMINOPHEN 1 TABLET: 5; 325 TABLET ORAL at 23:04

## 2023-10-07 NOTE — Clinical Note
[facility] FSED RAJENDRA  [address]  [facility] [address]    Maryana Huber was seen and treated in our emergency department on 10/7/2023.  She may return to work on 10/10/2023.         Thank you for choosing [facility].    Mar yJane Sosa RN

## 2023-10-08 NOTE — FSED PROVIDER NOTE
Subjective   History of Present Illness  54yo female pmh significant htn presents ED c/o slip et fall from standing position striking left shoulder/upper arm on ground.  Pt c/o left shoulder/upper arm/hand pain.  ROS neg loc/midline neck pain/back pain/chest pain/abd pain.    History provided by:  Patient  Fall  Injury location:  Shoulder/arm  Shoulder/arm injury location:  L arm, L shoulder and L hand      Review of Systems   Constitutional: Negative.    HENT: Negative.     Eyes: Negative.    Respiratory: Negative.     Cardiovascular: Negative.    Gastrointestinal: Negative.    Musculoskeletal:  Positive for arthralgias.   Skin: Negative.    All other systems reviewed and are negative.      Past Medical History:   Diagnosis Date    Abdominal cramping 07/17/2017    SEEN AT [facility]    Anemia     Anxiety     Arthralgia of left temporomandibular joint 06/2021    Atypical chest pain 08/30/2015    SEEN AT [facility]    Breast asymmetry     Carpal tunnel syndrome, bilateral     Cervical ca 2010    S/P LEEP    Chest pain on breathing 01/2019    Chronic neck pain     Constipation     Cystitis 03/2019    Depression     Dysuria     Fibrocystic breast     GERD (gastroesophageal reflux disease)     Hemorrhoids     Hypertension     Hypoglycemia 07/2012    IBS (irritable bowel syndrome)     Influenza 02/12/2018    Left breast mass 12/17/2019    Left-sided chest pain 12/2019    Migraine     Nodule of anus 10/2018    Occipital headache 11/24/2017    Pelvic pain     Periodic headache syndrome 10/2019    Right ankle sprain 08/30/2011    SEEN AT [facility]    Rosacea     Urgency of micturition     Vitamin B 12 deficiency        Allergies   Allergen Reactions    Clarithromycin GI Intolerance     Stomach cramps     Levofloxacin Nausea And Vomiting    Nitrofurantoin Monohyd Macro Nausea And Vomiting    Trazodone And Nefazodone Confusion     NIGHTMARES    Augmentin [Amoxicillin-Pot Clavulanate] GI Intolerance    Hydrocodone Unknown - Low Severity      Nausea    Tetracycline Hcl Unknown - Low Severity       Past Surgical History:   Procedure Laterality Date    APPENDECTOMY N/A     BREAST BIOPSY      BREAST LUMPECTOMY      BENIGN    COLONOSCOPY N/A 12/19/2011    ENTIRE COLON WNL, RESCOPE IN 5 YRS, DR. JAMAR BELL AT [facility]    ENDOSCOPY N/A 06/14/2011    NORMAL ESOPHAGUS, NORMAL DUODENUM, A FEW BENIGN GASTRIC POLYPS, DR. DAVID SANCHEZ AT [facility]    FACIAL LACERATIONS REPAIR Right 07/06/2011    RIGHT EYEBROWN, DONE AT [facility] ER    LEEP N/A 08/16/2010    FOR SEVERE DYSPLASIA OF CERVIX, LARGE AREA OF NON UPTAKE IN THE POSTERIOR LIP OF THE CERVIX, AND A SMALL AREA ON THE ANTERIOR LIP OF THE CERVIX, DR. KAYLEIGH GHOTRA AT [facility]    VAGINAL DELIVERY N/A 02/01/2001    DR. YESSENIA SHINE       Family History   Problem Relation Age of Onset    Colon polyps Mother     Colon polyps Father     Cancer Maternal Grandmother     Cancer Maternal Grandfather     No Known Problems Son     Colon cancer Maternal Aunt        Social History     Socioeconomic History    Marital status:    Tobacco Use    Smoking status: Never    Smokeless tobacco: Never   Vaping Use    Vaping Use: Never used   Substance and Sexual Activity    Alcohol use: No    Drug use: No    Sexual activity: Yes     Partners: Male     Comment: .           Objective   Physical Exam  Vitals and nursing note reviewed.   Constitutional:       Appearance: Normal appearance.   HENT:      Head: Normocephalic and atraumatic.      Right Ear: Tympanic membrane and external ear normal.      Left Ear: Tympanic membrane, ear canal and external ear normal.      Nose: Nose normal.      Mouth/Throat:      Mouth: Mucous membranes are moist.      Pharynx: Oropharynx is clear. No oropharyngeal exudate or posterior oropharyngeal erythema.   Neck:      Trachea: Trachea and phonation normal.     Cardiovascular:      Rate and Rhythm: Normal rate and regular rhythm.      Pulses: Normal pulses.      Heart sounds: Normal heart sounds. No  murmur heard.     No friction rub. No gallop.   Pulmonary:      Effort: Pulmonary effort is normal.      Breath sounds: Normal breath sounds. No wheezing, rhonchi or rales.   Abdominal:      General: Abdomen is flat. Bowel sounds are normal. There is no distension.      Palpations: Abdomen is soft.      Tenderness: There is no abdominal tenderness. There is no guarding or rebound.   Musculoskeletal:      Left shoulder: Tenderness present. No swelling, deformity or bony tenderness. Decreased range of motion. Normal pulse.      Left upper arm: Tenderness present. No swelling, edema or deformity.      Left elbow: Normal.      Left forearm: Normal.      Left wrist: Normal.      Left hand: Normal.        Arms:       Cervical back: Normal range of motion and neck supple. No swelling, deformity, rigidity or bony tenderness. Muscular tenderness present. No spinous process tenderness. Normal range of motion.      Thoracic back: Normal.      Lumbar back: Normal.   Lymphadenopathy:      Cervical: No cervical adenopathy.   Skin:     General: Skin is warm and dry.   Neurological:      General: No focal deficit present.      Mental Status: She is alert and oriented to person, place, and time.      GCS: GCS eye subscore is 4. GCS verbal subscore is 5. GCS motor subscore is 6.      Motor: Motor function is intact.         Procedures           ED Course      XR Shoulder 2+ View Left, XR Humerus Left, XR Hand 3+ View Left    Result Date: 10/7/2023  Narrative: LEFT SHOULDER, LEFT HAND, LEFT HUMERUS  HISTORY: Fall, pain in above areas.  LEFT SHOULDER: Three views of the shoulder including a Y view showed no evidence of fracture or dislocation.  LEFT HUMERUS: Two views of the left humerus showed no evidence of fracture or dislocation.  LEFT HAND: AP, lateral and oblique views of the left hand showed no evidence of fracture or dislocation. Moderate osteoarthritis at the base of the 1st metacarpal is noted.          NEXUS Criteria for  C-Spine Imaging - MDCalc  Calculated on Oct 07 2023 9:54 PM  If none of the above criteria are present, the C-Spine can be cleared clinically by these criteria. Imaging is not required.                                Medical Decision Making  Problems Addressed:  Contusion of left shoulder, initial encounter: complicated acute illness or injury  Sprain of acromioclavicular joint, left, initial encounter: complicated acute illness or injury    Amount and/or Complexity of Data Reviewed  Radiology: ordered.    Risk  Prescription drug management.        Final diagnoses:   Sprain of acromioclavicular joint, left, initial encounter   Contusion of left shoulder, initial encounter       ED Disposition  ED Disposition       ED Disposition   Discharge    Condition   Good    Comment   --               Omid Jarvis MD  [address] [id number] [phone]    Schedule an appointment as soon as possible for a visit in 2 days           Medication List        New Prescriptions      diclofenac 50 MG EC tablet  Commonly known as: VOLTAREN  Take 1 tablet by mouth 3 (Three) Times a Day As Needed (pain).               Where to Get Your Medications        These medications were sent to [facility] [id number] - [place], [place] - [address] AT [facility] [address] [facility] [phone] [facility] [phone]   [address], [facility] [address]      Phone: [phone]   diclofenac 50 MG EC tablet

## 2023-10-25 RX ORDER — VALSARTAN 160 MG/1
160 TABLET ORAL DAILY
Qty: 90 TABLET | Refills: 0 | Status: SHIPPED | OUTPATIENT
Start: 2023-10-25 | End: 2023-10-27 | Stop reason: SDUPTHER

## 2023-10-25 NOTE — TELEPHONE ENCOUNTER
Caller: Cecile Maryana M    Relationship: Self    Best call back number: [phone]    Requested Prescriptions:   Requested Prescriptions     Pending Prescriptions Disp Refills    valsartan (DIOVAN) 160 MG tablet 90 tablet 1     Sig: Take 1 tablet by mouth Daily.        Pharmacy where request should be sent: [facility] [id number] [facility] [address] [facility] [phone] [facility] [phone]      Last office visit with prescribing clinician: Visit date not found   Last telemedicine visit with prescribing clinician: Visit date not found   Next office visit with prescribing clinician: Visit date not found     Additional details provided by patient: THE PATIENT ONLY HAS 1 TABLET OF THIS MEDICATION LEFT. THE PATIENT STATES THAT HER BLOOD PRESSURE WILL YAYO-ROCKET IF SHE DOES NOT TAKE THIS MEDICATION. THE PATIENT IS DUE TO ESTABLISH CARE ON 10/27/2023.    Does the patient have less than a 3 day supply:  [x] Yes  [] No    Would you like a call back once the refill request has been completed: [x] Yes [] No    If the office needs to give you a call back, can they leave a voicemail: [x] Yes [] No    Zahira Velez Rep   10/25/23 15:26 EDT

## 2023-10-27 ENCOUNTER — OFFICE VISIT (OUTPATIENT)
Dept: FAMILY MEDICINE CLINIC | Facility: CLINIC | Age: 53
End: 2023-10-27
Payer: COMMERCIAL

## 2023-10-27 VITALS
HEART RATE: 84 BPM | BODY MASS INDEX: 29.25 KG/M2 | TEMPERATURE: 98 F | WEIGHT: 182 LBS | HEIGHT: 66 IN | RESPIRATION RATE: 18 BRPM | SYSTOLIC BLOOD PRESSURE: 120 MMHG | OXYGEN SATURATION: 90 % | DIASTOLIC BLOOD PRESSURE: 74 MMHG

## 2023-10-27 DIAGNOSIS — K21.9 GASTROESOPHAGEAL REFLUX DISEASE, UNSPECIFIED WHETHER ESOPHAGITIS PRESENT: ICD-10-CM

## 2023-10-27 DIAGNOSIS — J30.2 SEASONAL ALLERGIES: Primary | ICD-10-CM

## 2023-10-27 DIAGNOSIS — R42 DIZZY SPELLS: ICD-10-CM

## 2023-10-27 DIAGNOSIS — Z12.11 COLON CANCER SCREENING: ICD-10-CM

## 2023-10-27 DIAGNOSIS — G43.009 MIGRAINE WITHOUT AURA AND WITHOUT STATUS MIGRAINOSUS, NOT INTRACTABLE: ICD-10-CM

## 2023-10-27 DIAGNOSIS — Z12.11 ENCOUNTER FOR SCREENING COLONOSCOPY: ICD-10-CM

## 2023-10-27 DIAGNOSIS — K58.9 IRRITABLE BOWEL SYNDROME, UNSPECIFIED TYPE: ICD-10-CM

## 2023-10-27 DIAGNOSIS — G89.11 ACUTE SHOULDER PAIN DUE TO TRAUMA, LEFT: ICD-10-CM

## 2023-10-27 DIAGNOSIS — R10.33 PERIUMBILICAL ABDOMINAL PAIN: ICD-10-CM

## 2023-10-27 DIAGNOSIS — B00.9 HSV (HERPES SIMPLEX VIRUS) INFECTION: ICD-10-CM

## 2023-10-27 DIAGNOSIS — K21.9 GASTROESOPHAGEAL REFLUX DISEASE WITHOUT ESOPHAGITIS: ICD-10-CM

## 2023-10-27 DIAGNOSIS — R10.33 UMBILICAL PAIN: ICD-10-CM

## 2023-10-27 DIAGNOSIS — Z11.59 NEED FOR HEPATITIS C SCREENING TEST: ICD-10-CM

## 2023-10-27 DIAGNOSIS — Z12.31 BREAST CANCER SCREENING BY MAMMOGRAM: ICD-10-CM

## 2023-10-27 DIAGNOSIS — Z13.220 LIPID SCREENING: ICD-10-CM

## 2023-10-27 DIAGNOSIS — I10 ESSENTIAL HYPERTENSION: ICD-10-CM

## 2023-10-27 DIAGNOSIS — E55.9 VITAMIN D DEFICIENCY: ICD-10-CM

## 2023-10-27 DIAGNOSIS — M25.512 ACUTE SHOULDER PAIN DUE TO TRAUMA, LEFT: ICD-10-CM

## 2023-10-27 DIAGNOSIS — H93.12 TINNITUS OF LEFT EAR: ICD-10-CM

## 2023-10-27 DIAGNOSIS — C53.9 MALIGNANT NEOPLASM OF CERVIX, UNSPECIFIED SITE: ICD-10-CM

## 2023-10-27 PROBLEM — J01.00 SUBACUTE MAXILLARY SINUSITIS: Status: RESOLVED | Noted: 2022-11-02 | Resolved: 2023-10-27

## 2023-10-27 PROBLEM — J02.9 SORE THROAT: Status: RESOLVED | Noted: 2022-11-02 | Resolved: 2023-10-27

## 2023-10-27 PROBLEM — H66.003 NON-RECURRENT ACUTE SUPPURATIVE OTITIS MEDIA OF BOTH EARS WITHOUT SPONTANEOUS RUPTURE OF TYMPANIC MEMBRANES: Status: RESOLVED | Noted: 2022-11-02 | Resolved: 2023-10-27

## 2023-10-27 RX ORDER — ERGOCALCIFEROL 1.25 MG/1
50000 CAPSULE ORAL WEEKLY
Qty: 16 CAPSULE | Refills: 3 | Status: SHIPPED | OUTPATIENT
Start: 2023-10-27

## 2023-10-27 RX ORDER — IBUPROFEN 600 MG/1
600 TABLET ORAL EVERY 8 HOURS PRN
Qty: 40 TABLET | Refills: 0 | Status: SHIPPED | OUTPATIENT
Start: 2023-10-27

## 2023-10-27 RX ORDER — SENNOSIDES 8.6 MG
650 CAPSULE ORAL EVERY 8 HOURS PRN
Qty: 30 TABLET | Refills: 1 | Status: SHIPPED | OUTPATIENT
Start: 2023-10-27

## 2023-10-27 RX ORDER — VALACYCLOVIR HYDROCHLORIDE 1 G/1
1000 TABLET, FILM COATED ORAL
COMMUNITY
Start: 2023-09-26 | End: 2023-10-27 | Stop reason: SDUPTHER

## 2023-10-27 RX ORDER — MONTELUKAST SODIUM 10 MG/1
10 TABLET ORAL NIGHTLY
Qty: 90 TABLET | Refills: 1 | Status: SHIPPED | OUTPATIENT
Start: 2023-10-27

## 2023-10-27 RX ORDER — MULTIPLE VITAMINS W/ MINERALS TAB 9MG-400MCG
1 TAB ORAL DAILY
Qty: 90 TABLET | Refills: 3 | Status: SHIPPED | OUTPATIENT
Start: 2023-10-27 | End: 2024-10-21

## 2023-10-27 RX ORDER — SUMATRIPTAN 50 MG/1
TABLET, FILM COATED ORAL
Qty: 9 TABLET | Refills: 3 | Status: SHIPPED | OUTPATIENT
Start: 2023-10-27

## 2023-10-27 RX ORDER — VALSARTAN 160 MG/1
160 TABLET ORAL DAILY
Qty: 90 TABLET | Refills: 0 | Status: SHIPPED | OUTPATIENT
Start: 2023-10-27

## 2023-10-27 RX ORDER — ESOMEPRAZOLE MAGNESIUM 40 MG/1
40 CAPSULE, DELAYED RELEASE ORAL DAILY
Qty: 90 CAPSULE | Refills: 0 | Status: SHIPPED | OUTPATIENT
Start: 2023-10-27

## 2023-10-27 RX ORDER — VALACYCLOVIR HYDROCHLORIDE 1 G/1
1000 TABLET, FILM COATED ORAL 3 TIMES DAILY
Qty: 9 TABLET | Refills: 3 | Status: SHIPPED | OUTPATIENT
Start: 2023-10-27

## 2023-10-27 RX ORDER — FLUTICASONE PROPIONATE 50 MCG
1 SPRAY, SUSPENSION (ML) NASAL DAILY
Qty: 16 ML | Refills: 9 | Status: SHIPPED | OUTPATIENT
Start: 2023-10-27

## 2023-10-27 NOTE — ASSESSMENT & PLAN NOTE
GERD precautions: Avoid fatty, greasy, spicy food, if current tobacco use stop smoking, stay upright for one hour before lying down. Also avoid Tobacco, caffeine, soda, mint, and garlic.  Counseled patient regarding the side effects of long term Proton Pump Inhibitor use not limited to renal and hepatic impairment and pancytopenia. Also counseled patient to gradually taper down the PPI dose and follow GERD precautions in order to control heartburn symptoms and discontinue PPI.

## 2023-10-27 NOTE — PROGRESS NOTES
"Chief Complaint  fell 2.5 weeks ago (Has severe pain in left shoulder since fall), Back Pain (Low back and neck not improving since fall,shooting pain in umbilical area), and Med Refill (Only needs Valsartan and Sumitriptan, Linzess, singular, esomeprazole,albuterol and fluticasone,Nyzoral shampoo,Spironolactone uses for acne)    Dora Huber presents to [facility]  History of Present Illness  52yo WF Patient was previously seen by nurse practitioners as PCP at [facility], however patient is new to me and is here for establishment of care visit for chronic medical conditions including hypertension, seasonal allergies, GERD.    Pt states she was walking fast at [employer] and slid over something slick and pt reports she flew up in air sideways and injured her left shoulder. But pt also c/o pain in her naval area since the fall. Pt went to [facility] urgent care and s/p xray of left arm/shoulder and was negative.    Pt reports left shoulder still hurting and having difficulty with typing at work. Pt desires light duty until further evaluation by Orthopedics.    Pt also c/o ringing in her left ear and dizzy spells since the fall, pt states the sx started 1-2 days after her recent fall.  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    Pt also c/o umbilical pain shooting in nature, occurs randomly lasts transiently and then stops; pt reports 4x yesterday and 3x today so far.    Pt s/p hx of crevial cancer s/p LEEP procedure. Pt last PAP smear was 2 years ago by OB-Gyn. Rec f/u PAP q3yrs.      Back Pain        Objective   Vital Signs:  /74 (BP Location: Right arm, Patient Position: Sitting, Cuff Size: Adult)   Pulse 84   Temp 98 °F (36.7 °C) (Infrared)   Resp 18   Ht 167.6 cm (66\")   Wt 82.6 kg (182 lb)   SpO2 90%   BMI 29.38 kg/m²   Estimated body mass index is 29.38 kg/m² as calculated from the " "following:    Height as of this encounter: 167.6 cm (66\").    Weight as of this encounter: 82.6 kg (182 lb).               Physical Exam  Constitutional:       Appearance: Normal appearance.   HENT:      Head: Normocephalic and atraumatic.      Ears:      Comments: Normal otoscope exam b/l ears.  Eyes:      Conjunctiva/sclera: Conjunctivae normal.   Cardiovascular:      Rate and Rhythm: Normal rate and regular rhythm.      Heart sounds: Normal heart sounds.   Pulmonary:      Effort: Pulmonary effort is normal.      Breath sounds: Normal breath sounds.   Abdominal:      General: Bowel sounds are normal.      Palpations: Abdomen is soft.      Comments: Positive pain in the periumbilical region on palpation and also mild pain in left lower quadrant on palpation.  Otherwise abdomen Non-tender to palpation.  Soft, positive bowel sounds.  No rebound tenderness.   Musculoskeletal:      Comments: Pt unable to raise her left shoulder above neck level.   +empty can test on left shoulder.  Exam indicated Rotator cuff pathology mostly in the supraspinatus area.   Skin:     General: Skin is warm.   Neurological:      General: No focal deficit present.      Mental Status: She is alert and oriented to person, place, and time.   Psychiatric:         Mood and Affect: Mood normal.         Behavior: Behavior normal.        Result Review :  The following data was reviewed by: TERRELL Xiao on 10/27/2023:  Common labs          11/4/2022    16:03 2/10/2023    15:48   Common Labs   WBC 5.50  8.2    Hemoglobin 11.5  11.5    Hematocrit 34.5  34.4    Platelets 317  376                   Assessment and Plan   Diagnoses and all orders for this visit:    1. Seasonal allergies (Primary)  -     fluticasone (FLONASE) 50 MCG/ACT nasal spray; 1 spray by Each Nare route Daily.  Dispense: 16 mL; Refill: 9  -     montelukast (Singulair) 10 MG tablet; Take 1 tablet by mouth Every Night.  Dispense: 90 tablet; Refill: 1    2. Essential " hypertension  Assessment & Plan:  Hypertension is unchanged.  Continue current treatment regimen.  Dietary sodium restriction.  Blood pressure will be reassessed in 3 months.  Discussed the importance of healthy diet, nutrition, and lifestyle. Recommend low salt, fat/cholesterol diet and avoid concentrated sweets. Encouraged DASH diet along with fresh fruits & vegetables and low fat dairy products. Counseled patient to exercise/walk as tolerated. Avoid tobacco and alcohol use.      Orders:  -     CBC & Differential; Future  -     Lipid Panel; Future  -     Comprehensive Metabolic Panel; Future  -     Urinalysis With Microscopic - Urine, Clean Catch; Future  -     TSH Rfx On Abnormal To Free T4; Future  -     valsartan (DIOVAN) 160 MG tablet; Take 1 tablet by mouth Daily.  Dispense: 90 tablet; Refill: 0    3. Irritable bowel syndrome, unspecified type  -     linaclotide (LINZESS) 72 MCG capsule capsule; Take 1 capsule by mouth Every Morning Before Breakfast.  Dispense: 30 capsule; Refill: 3    4. Malignant neoplasm of cervix, unspecified site    5. Acute shoulder pain due to trauma, left  Assessment & Plan:  Counseled patient of only light use of left shoulder until further evaluation and clearance by orthopedic surgery.  Letter for light duty at work provided to patient.    Orders:  -     Ambulatory Referral to Orthopedic Surgery  -     Diclofenac Sodium (VOLTAREN) 1 % gel gel; Apply 4 g topically to the appropriate area as directed 2 (Two) Times a Day As Needed (MSK pain) for up to 90 days.  Dispense: 100 g; Refill: 2  -     ibuprofen (ADVIL,MOTRIN) 600 MG tablet; Take 1 tablet by mouth Every 8 (Eight) Hours As Needed for Mild Pain.  Dispense: 40 tablet; Refill: 0  -     acetaminophen (Tylenol 8 Hour) 650 MG 8 hr tablet; Take 1 tablet by mouth Every 8 (Eight) Hours As Needed for Moderate Pain.  Dispense: 30 tablet; Refill: 1    6. Lipid screening  Assessment & Plan:  Discussed the importance of healthy diet,  nutrition, and lifestyle. Recommend low salt, fat/cholesterol diet and avoid concentrated sweets. Encouraged DASH diet along with fresh fruits & vegetables and low fat dairy products. Counseled patient to exercise/walk as tolerated. Avoid tobacco and alcohol use.      Orders:  -     Lipid Panel; Future    7. Tinnitus of left ear  -     Ambulatory Referral to ENT (Otolaryngology)    8. Dizzy spells  Assessment & Plan:  Instructed patient to Return to Office as needed for persistent or new symptoms and/or go to ER for worsening symptoms, patient voiced understanding.   Counseled patient on fall prevention, patient voiced understanding.    Orders:  -     CT Head With & Without Contrast; Future    9. Umbilical pain  -     Cancel: Ambulatory Referral to Gastroenterology    10. Periumbilical abdominal pain  -     CT Abdomen Pelvis With Contrast; Future  -     Cancel: Ambulatory Referral to Gastroenterology  -     Ambulatory Referral to Gastroenterology    11. Breast cancer screening by mammogram  -     Mammo Screening Digital Tomosynthesis Bilateral With CAD; Future    12. Need for hepatitis C screening test    13. Colon cancer screening  -     Cancel: Cologuard - Stool, Per Rectum; Future    14. Vitamin D deficiency  -     Vitamin D,25-Hydroxy; Future  -     vitamin D (ERGOCALCIFEROL) 1.25 MG (96660 UT) capsule capsule; Take 1 capsule by mouth 1 (One) Time Per Week.  Dispense: 16 capsule; Refill: 3    15. Gastroesophageal reflux disease, unspecified whether esophagitis present  -     esomeprazole (nexIUM) 40 MG capsule; Take 1 capsule by mouth Daily.  Dispense: 90 capsule; Refill: 0    16. HSV (herpes simplex virus) infection  -     valACYclovir (VALTREX) 1000 MG tablet; Take 1 tablet by mouth 3 (Three) Times a Day. Prn cold sore  Dispense: 9 tablet; Refill: 3    17. Migraine without aura and without status migrainosus, not intractable  Assessment & Plan:  Headaches are improving with treatment.  Continue current  treatment regimen.  Stable        Orders:  -     SUMAtriptan (Imitrex) 50 MG tablet; Take one tablet at onset of headache. May repeat dose one time in 2 hours if headache not relieved.  Dispense: 9 tablet; Refill: 3    18. Encounter for screening colonoscopy  -     Cancel: Ambulatory Referral to Gastroenterology    19. Gastroesophageal reflux disease without esophagitis  Assessment & Plan:  GERD precautions: Avoid fatty, greasy, spicy food, if current tobacco use stop smoking, stay upright for one hour before lying down. Also avoid Tobacco, caffeine, soda, mint, and garlic.  Counseled patient regarding the side effects of long term Proton Pump Inhibitor use not limited to renal and hepatic impairment and pancytopenia. Also counseled patient to gradually taper down the PPI dose and follow GERD precautions in order to control heartburn symptoms and discontinue PPI.        Other orders  -     multivitamin with minerals (Multivitamin Adult) tablet tablet; Take 1 tablet by mouth Daily for 360 days.  Dispense: 90 tablet; Refill: 3      Instructed patient to Return to Office as needed for persistent or new symptoms and/or go to ER for worsening symptoms, patient voiced understanding.   Counseled patient on importance of routine preventive screenings and that patient needs a Pap smear at least every 3 years due to history of cervical cancer, patient voiced understanding.       Follow Up   Return in about 3 weeks (around 11/17/2023) for Recheck.  Patient was given instructions and counseling regarding her condition or for health maintenance advice. Please see specific information pulled into the AVS if appropriate.

## 2023-10-27 NOTE — ASSESSMENT & PLAN NOTE
Hypertension is unchanged.  Continue current treatment regimen.  Dietary sodium restriction.  Blood pressure will be reassessed in 3 months.  Discussed the importance of healthy diet, nutrition, and lifestyle. Recommend low salt, fat/cholesterol diet and avoid concentrated sweets. Encouraged DASH diet along with fresh fruits & vegetables and low fat dairy products. Counseled patient to exercise/walk as tolerated. Avoid tobacco and alcohol use.

## 2023-10-27 NOTE — ASSESSMENT & PLAN NOTE
Instructed patient to Return to Office as needed for persistent or new symptoms and/or go to ER for worsening symptoms, patient voiced understanding.   Counseled patient on fall prevention, patient voiced understanding.

## 2023-10-27 NOTE — ASSESSMENT & PLAN NOTE
Counseled patient of only light use of left shoulder until further evaluation and clearance by orthopedic surgery.  Letter for light duty at work provided to patient.

## 2023-11-02 ENCOUNTER — OFFICE VISIT (OUTPATIENT)
Dept: ORTHOPEDIC SURGERY | Facility: CLINIC | Age: 53
End: 2023-11-02
Payer: COMMERCIAL

## 2023-11-02 VITALS — WEIGHT: 181.2 LBS | TEMPERATURE: 98.2 F | BODY MASS INDEX: 29.12 KG/M2 | HEIGHT: 66 IN

## 2023-11-02 DIAGNOSIS — M75.82 ROTATOR CUFF TENDONITIS, LEFT: Primary | ICD-10-CM

## 2023-11-02 DIAGNOSIS — R52 PAIN: ICD-10-CM

## 2023-11-02 RX ORDER — METRONIDAZOLE 7.5 MG/G
GEL TOPICAL
COMMUNITY
Start: 2023-09-26

## 2023-11-02 RX ORDER — METHYLPREDNISOLONE ACETATE 80 MG/ML
80 INJECTION, SUSPENSION INTRA-ARTICULAR; INTRALESIONAL; INTRAMUSCULAR; SOFT TISSUE
Status: COMPLETED | OUTPATIENT
Start: 2023-11-02 | End: 2023-11-02

## 2023-11-02 RX ORDER — LIDOCAINE HYDROCHLORIDE 10 MG/ML
2 INJECTION, SOLUTION EPIDURAL; INFILTRATION; INTRACAUDAL; PERINEURAL
Status: COMPLETED | OUTPATIENT
Start: 2023-11-02 | End: 2023-11-02

## 2023-11-02 RX ORDER — HYDROCORTISONE 25 MG/ML
LOTION TOPICAL
COMMUNITY
Start: 2023-05-23

## 2023-11-02 RX ADMIN — LIDOCAINE HYDROCHLORIDE 2 ML: 10 INJECTION, SOLUTION EPIDURAL; INFILTRATION; INTRACAUDAL; PERINEURAL at 15:54

## 2023-11-02 RX ADMIN — METHYLPREDNISOLONE ACETATE 80 MG: 80 INJECTION, SUSPENSION INTRA-ARTICULAR; INTRALESIONAL; INTRAMUSCULAR; SOFT TISSUE at 15:54

## 2023-11-02 NOTE — PROGRESS NOTES
General Exam    Patient: Maryana Huber    YOB: 1970    Medical Record Number: [id number]    Chief Complaints: Left shoulder pain    History of Present Illness:     53 y.o. female patient who presents for evaluation of left shoulder pain.  She had a fall almost a month ago onto her shoulder.  She is continue to have pain about her shoulder and neck difficulty reaching out from her body and lifting up overhead.    Denies any numbness or tingling.  Denies any fevers, cough or shortness of breath.    Allergies:   Allergies   Allergen Reactions    Clarithromycin GI Intolerance     Stomach cramps     Levofloxacin Nausea And Vomiting    Nitrofurantoin Monohyd Macro Nausea And Vomiting    Trazodone And Nefazodone Confusion     NIGHTMARES    Augmentin [Amoxicillin-Pot Clavulanate] GI Intolerance    Hydrocodone Unknown - Low Severity     Nausea    Tetracycline Hcl Unknown - Low Severity       Home Medications:      Current Outpatient Medications:     acetaminophen (Tylenol 8 Hour) 650 MG 8 hr tablet, Take 1 tablet by mouth Every 8 (Eight) Hours As Needed for Moderate Pain., Disp: 30 tablet, Rfl: 1    clobetasol (TEMOVATE) 0.05 % external solution, , Disp: , Rfl:     Diclofenac Sodium (VOLTAREN) 1 % gel gel, Apply 4 g topically to the appropriate area as directed 2 (Two) Times a Day As Needed (MSK pain) for up to 90 days., Disp: 100 g, Rfl: 2    esomeprazole (nexIUM) 40 MG capsule, Take 1 capsule by mouth Daily., Disp: 90 capsule, Rfl: 0    fluticasone (FLONASE) 50 MCG/ACT nasal spray, 1 spray by Each Nare route Daily., Disp: 16 mL, Rfl: 9    hydrocortisone 2.5 % lotion, , Disp: , Rfl:     ibuprofen (ADVIL,MOTRIN) 600 MG tablet, Take 1 tablet by mouth Every 8 (Eight) Hours As Needed for Mild Pain., Disp: 40 tablet, Rfl: 0    ketoconazole (NIZORAL) 2 % shampoo, , Disp: , Rfl:     linaclotide (LINZESS) 72 MCG capsule capsule, Take 1 capsule by mouth Every Morning Before Breakfast., Disp: 30 capsule, Rfl: 3     metroNIDAZOLE (METROGEL) 0.75 % gel, , Disp: , Rfl:     montelukast (Singulair) 10 MG tablet, Take 1 tablet by mouth Every Night., Disp: 90 tablet, Rfl: 1    multivitamin with minerals (Multivitamin Adult) tablet tablet, Take 1 tablet by mouth Daily for 360 days., Disp: 90 tablet, Rfl: 3    Sodium Fluoride 5000 PPM 1.1 % paste, , Disp: , Rfl:     Soolantra 1 % cream, , Disp: , Rfl:     spironolactone (ALDACTONE) 25 MG tablet, , Disp: , Rfl:     SUMAtriptan (Imitrex) 50 MG tablet, Take one tablet at onset of headache. May repeat dose one time in 2 hours if headache not relieved., Disp: 9 tablet, Rfl: 3    tretinoin (RETIN-A) 0.025 % cream, , Disp: , Rfl:     valACYclovir (VALTREX) 1000 MG tablet, Take 1 tablet by mouth 3 (Three) Times a Day. Prn cold sore, Disp: 9 tablet, Rfl: 3    valsartan (DIOVAN) 160 MG tablet, Take 1 tablet by mouth Daily., Disp: 90 tablet, Rfl: 0    vitamin D (ERGOCALCIFEROL) 1.25 MG (10531 UT) capsule capsule, Take 1 capsule by mouth 1 (One) Time Per Week., Disp: 16 capsule, Rfl: 3    Past Medical History:   Diagnosis Date    Abdominal cramping 07/17/2017    SEEN AT [facility] ER    Anemia     Anxiety     Arthralgia of left temporomandibular joint 06/2021    Atypical chest pain 08/30/2015    SEEN AT [facility] ER    Breast asymmetry     Carpal tunnel syndrome, bilateral     Cervical ca 2010    S/P LEEP    Chest pain on breathing 01/2019    Chronic neck pain     Constipation     Cystitis 03/2019    Depression     Dysuria     Fibrocystic breast     GERD (gastroesophageal reflux disease)     Hemorrhoids     Hypertension     Hypoglycemia 07/2012    IBS (irritable bowel syndrome)     Influenza 02/12/2018    Left breast mass 12/17/2019    Left-sided chest pain 12/2019    Migraine     Nodule of anus 10/2018    Occipital headache 11/24/2017    Pelvic pain     Periodic headache syndrome 10/2019    Right ankle sprain 08/30/2011    SEEN AT [facility] ER    Rosacea     Urgency of micturition     Vitamin B 12 deficiency   "      Past Surgical History:   Procedure Laterality Date    APPENDECTOMY N/A     BREAST BIOPSY      BREAST LUMPECTOMY      BENIGN    COLONOSCOPY N/A 12/19/2011    ENTIRE COLON WNL, RESCOPE IN 5 YRS, DR. JAMAR BELL AT [facility]    ENDOSCOPY N/A 06/14/2011    NORMAL ESOPHAGUS, NORMAL DUODENUM, A FEW BENIGN GASTRIC POLYPS, DR. DAVID SANCHEZ AT [facility]    FACIAL LACERATIONS REPAIR Right 07/06/2011    RIGHT EYEBROWN, DONE AT [facility] ER    LEEP N/A 08/16/2010    FOR SEVERE DYSPLASIA OF CERVIX, LARGE AREA OF NON UPTAKE IN THE POSTERIOR LIP OF THE CERVIX, AND A SMALL AREA ON THE ANTERIOR LIP OF THE CERVIX, DR. KAYLEIGH GHOTRA AT [facility]    VAGINAL DELIVERY N/A 02/01/2001    DR. YESSENIA SHINE       Social History     Occupational History    Not on file   Tobacco Use    Smoking status: Never    Smokeless tobacco: Never   Vaping Use    Vaping Use: Never used   Substance and Sexual Activity    Alcohol use: No    Drug use: No    Sexual activity: Yes     Partners: Male     Comment: .      Social History     Social History Narrative    Not on file       Family History   Problem Relation Age of Onset    Colon polyps Mother     Colon polyps Father     Cancer Maternal Grandmother     Cancer Maternal Grandfather     No Known Problems Son     Colon cancer Maternal Aunt        Review of Systems:      Constitutional: Denies fever, shaking or chills         All other pertinent positives and negatives as noted above in HPI.    Physical Exam: 53 y.o. female    Vitals:    11/02/23 1513   Temp: 98.2 °F (36.8 °C)   TempSrc: Temporal   Weight: 82.2 kg (181 lb 3.2 oz)   Height: 167.6 cm (65.98\")       General:  Patient is awake and alert.  Appears in no acute distress or discomfort.      Musculoskeletal/Extremities:    Left upper extremity examined minimal tenderness palpation limited forward flexion abduction about 90 degrees.  Rotator cuff strength appear to be intact.  External rotation equal to contralateral side.  Motor and sensory intact distally    "      Radiology:       3 views left shoulder AP axillary and scapular Y taken reviewed to evaluate the patient's complaint/s.    Imaging demonstrates overall preservation glenohumeral joint space no acute fracture noted.  No significant changes from previous films.        Assessment: Left shoulder rotator cuff tendinitis      Plan:      I discussed the findings with the patient seems more soft tissue in nature there could be some degenerative changes versus tear however at this time seems to be very inflamed.  This time we will plan for conservative treatment consisting of rest, ice, activity modification, injection, anti-inflammatories if can take and tolerate and formal therapy.  If symptoms are improving the next 4 weeks may consider MRI for further evaluation management.  Patient understands and agrees the plan    ..Large Joint Arthrocentesis: L subacromial bursa  Date/Time: 11/2/2023 3:54 PM  Consent given by: patient  Site marked: site marked  Timeout: Immediately prior to procedure a time out was called to verify the correct patient, procedure, equipment, support staff and site/side marked as required   Supporting Documentation  Indications: pain   Procedure Details  Location: shoulder - L subacromial bursa  Preparation: Patient was prepped and draped in the usual sterile fashion  Needle gauge: 21 G.  Approach: posterior  Medications administered: 2 mL lidocaine PF 1% 1 %; 80 mg methylPREDNISolone acetate 80 MG/ML  Patient tolerance: patient tolerated the procedure well with no immediate complications                    We will plan for follow up as needed.    All questions were answered.  Patient understands and agrees with the plan.    Km Buchanan MD    11/02/2023    CC to Ben Barone APRN

## 2023-11-17 ENCOUNTER — HOSPITAL ENCOUNTER (OUTPATIENT)
Dept: MAMMOGRAPHY | Facility: HOSPITAL | Age: 53
Discharge: HOME | End: 2023-11-17
Admitting: STUDENT IN AN ORGANIZED HEALTH CARE EDUCATION/TRAINING PROGRAM
Payer: COMMERCIAL

## 2023-11-17 DIAGNOSIS — Z12.31 BREAST CANCER SCREENING BY MAMMOGRAM: ICD-10-CM

## 2023-11-17 PROCEDURE — 77067 SCR MAMMO BI INCL CAD: CPT

## 2023-11-17 PROCEDURE — 77063 BREAST TOMOSYNTHESIS BI: CPT

## 2023-11-20 DIAGNOSIS — E78.2 MIXED HYPERLIPIDEMIA: Primary | ICD-10-CM

## 2023-11-20 RX ORDER — OMEGA-3/DHA/EPA/FISH OIL 300-1000MG
1 CAPSULE ORAL DAILY
Qty: 180 CAPSULE | Refills: 3 | Status: SHIPPED | OUTPATIENT
Start: 2023-11-20

## 2023-11-27 ENCOUNTER — TELEPHONE (OUTPATIENT)
Dept: GASTROENTEROLOGY | Facility: CLINIC | Age: 53
End: 2023-11-27
Payer: COMMERCIAL

## 2023-11-27 NOTE — TELEPHONE ENCOUNTER
CALLER: Maryana Cecile     RELATIONSHIP TO PATIENT: Self    BEST CALL BACK NUMBER: [phone]    CALL REGARDING: Called to offer to be seen by Svitlana High on Fri. 12/1/2023 at 10:30am instead of being seen at original time of 3:00pm due to Svitlana High being out of office in the afternoon.     Ms. Huber is not available to be seen at 10:30am due to work. Did express that she is available after 2:00pm on Fridays due to work schedule if Svitlana High is available.      Rescheduled appointment for 1/5/2024    Expressed concern of would like to have colonoscopy before the end of year due to having symptoms.    Patient Request a Call Back

## 2023-11-30 ENCOUNTER — HOSPITAL ENCOUNTER (OUTPATIENT)
Dept: CT IMAGING | Facility: HOSPITAL | Age: 53
Discharge: HOME | End: 2023-11-30
Payer: COMMERCIAL

## 2023-11-30 DIAGNOSIS — R42 DIZZY SPELLS: ICD-10-CM

## 2023-11-30 DIAGNOSIS — R10.33 PERIUMBILICAL ABDOMINAL PAIN: ICD-10-CM

## 2023-12-01 ENCOUNTER — HOSPITAL ENCOUNTER (OUTPATIENT)
Facility: HOSPITAL | Age: 53
Discharge: HOME | End: 2023-12-01
Admitting: STUDENT IN AN ORGANIZED HEALTH CARE EDUCATION/TRAINING PROGRAM
Payer: COMMERCIAL

## 2023-12-01 PROCEDURE — 0 DIATRIZOATE MEGLUMINE & SODIUM PER 1 ML: Performed by: STUDENT IN AN ORGANIZED HEALTH CARE EDUCATION/TRAINING PROGRAM

## 2023-12-01 PROCEDURE — 25510000001 IOPAMIDOL 61 % SOLUTION: Performed by: STUDENT IN AN ORGANIZED HEALTH CARE EDUCATION/TRAINING PROGRAM

## 2023-12-01 PROCEDURE — 70470 CT HEAD/BRAIN W/O & W/DYE: CPT

## 2023-12-01 PROCEDURE — 74177 CT ABD & PELVIS W/CONTRAST: CPT

## 2023-12-01 RX ADMIN — DIATRIZOATE MEGLUMINE AND DIATRIZOATE SODIUM 30 ML: 600; 100 SOLUTION ORAL; RECTAL at 14:28

## 2023-12-01 RX ADMIN — IOPAMIDOL 100 ML: 612 INJECTION, SOLUTION INTRAVENOUS at 15:41

## 2023-12-05 DIAGNOSIS — R10.33 PERIUMBILICAL ABDOMINAL PAIN: Primary | ICD-10-CM

## 2023-12-08 ENCOUNTER — OFFICE VISIT (OUTPATIENT)
Dept: FAMILY MEDICINE CLINIC | Facility: CLINIC | Age: 53
End: 2023-12-08
Payer: COMMERCIAL

## 2023-12-08 VITALS
DIASTOLIC BLOOD PRESSURE: 80 MMHG | TEMPERATURE: 98 F | WEIGHT: 182 LBS | SYSTOLIC BLOOD PRESSURE: 130 MMHG | HEART RATE: 78 BPM | RESPIRATION RATE: 18 BRPM | OXYGEN SATURATION: 97 % | HEIGHT: 65 IN | BODY MASS INDEX: 30.32 KG/M2

## 2023-12-08 DIAGNOSIS — R07.9 INTERMITTENT CHEST PAIN: ICD-10-CM

## 2023-12-08 DIAGNOSIS — J30.2 SEASONAL ALLERGIES: ICD-10-CM

## 2023-12-08 DIAGNOSIS — J20.9 ACUTE BRONCHITIS, UNSPECIFIED ORGANISM: ICD-10-CM

## 2023-12-08 DIAGNOSIS — M94.0 COSTOCHONDRITIS, ACUTE: Primary | ICD-10-CM

## 2023-12-08 DIAGNOSIS — K29.70 GASTRITIS WITHOUT BLEEDING, UNSPECIFIED CHRONICITY, UNSPECIFIED GASTRITIS TYPE: ICD-10-CM

## 2023-12-08 DIAGNOSIS — N95.1 HOT FLASH, MENOPAUSAL: ICD-10-CM

## 2023-12-08 DIAGNOSIS — R06.09 DOE (DYSPNEA ON EXERTION): ICD-10-CM

## 2023-12-08 DIAGNOSIS — G89.29 CHRONIC MIDLINE LOW BACK PAIN, UNSPECIFIED WHETHER SCIATICA PRESENT: ICD-10-CM

## 2023-12-08 DIAGNOSIS — M54.50 CHRONIC MIDLINE LOW BACK PAIN, UNSPECIFIED WHETHER SCIATICA PRESENT: ICD-10-CM

## 2023-12-08 DIAGNOSIS — R06.01 ORTHOPNEA: ICD-10-CM

## 2023-12-08 DIAGNOSIS — M54.9 MID BACK PAIN: ICD-10-CM

## 2023-12-08 DIAGNOSIS — K76.0 FATTY LIVER: ICD-10-CM

## 2023-12-08 PROBLEM — Z11.59 NEED FOR HEPATITIS C SCREENING TEST: Status: RESOLVED | Noted: 2023-10-27 | Resolved: 2023-12-08

## 2023-12-08 PROBLEM — Z12.11 COLON CANCER SCREENING: Status: RESOLVED | Noted: 2023-10-27 | Resolved: 2023-12-08

## 2023-12-08 PROCEDURE — 99214 OFFICE O/P EST MOD 30 MIN: CPT | Performed by: STUDENT IN AN ORGANIZED HEALTH CARE EDUCATION/TRAINING PROGRAM

## 2023-12-08 PROCEDURE — 93000 ELECTROCARDIOGRAM COMPLETE: CPT | Performed by: STUDENT IN AN ORGANIZED HEALTH CARE EDUCATION/TRAINING PROGRAM

## 2023-12-08 RX ORDER — BUDESONIDE AND FORMOTEROL FUMARATE DIHYDRATE 160; 4.5 UG/1; UG/1
2 AEROSOL RESPIRATORY (INHALATION)
Qty: 1 EACH | Refills: 0 | Status: SHIPPED | OUTPATIENT
Start: 2023-12-08

## 2023-12-08 RX ORDER — FLUTICASONE PROPIONATE 50 MCG
1 SPRAY, SUSPENSION (ML) NASAL DAILY
Qty: 16 ML | Refills: 9 | Status: SHIPPED | OUTPATIENT
Start: 2023-12-08

## 2023-12-08 RX ORDER — ALBUTEROL SULFATE 90 UG/1
2 AEROSOL, METERED RESPIRATORY (INHALATION) EVERY 6 HOURS PRN
Qty: 8.7 G | Refills: 5 | Status: SHIPPED | OUTPATIENT
Start: 2023-12-08

## 2023-12-08 RX ORDER — GUAIFENESIN 600 MG/1
1200 TABLET, EXTENDED RELEASE ORAL 2 TIMES DAILY
Qty: 20 TABLET | Refills: 0 | Status: SHIPPED | OUTPATIENT
Start: 2023-12-08 | End: 2023-12-18

## 2023-12-08 RX ORDER — PREDNISONE 20 MG/1
20 TABLET ORAL DAILY
Qty: 5 TABLET | Refills: 0 | Status: SHIPPED | OUTPATIENT
Start: 2023-12-08 | End: 2023-12-13

## 2023-12-08 RX ORDER — CETIRIZINE HYDROCHLORIDE 10 MG/1
10 TABLET ORAL NIGHTLY
Qty: 30 TABLET | Refills: 5 | Status: SHIPPED | OUTPATIENT
Start: 2023-12-08

## 2023-12-08 RX ORDER — AZITHROMYCIN 250 MG/1
TABLET, FILM COATED ORAL
Qty: 6 TABLET | Refills: 0 | Status: SHIPPED | OUTPATIENT
Start: 2023-12-08

## 2023-12-08 RX ORDER — FEZOLINETANT 45 MG/1
45 TABLET, FILM COATED ORAL EVERY 24 HOURS
Qty: 30 TABLET | Refills: 3 | Status: SHIPPED | OUTPATIENT
Start: 2023-12-08

## 2023-12-08 NOTE — PROGRESS NOTES
"Chief Complaint  Results (Discuss c/t scans/SOA when lays flat/was concerned about the atelectasis on xray also discuss lab results)    Subjective        Maryana Huber presents to [facility]  History of Present Illness  53-year-old white female with multiple medical problems and history of recent fall few weeks ago is here for follow-up visit for multiple medical issues.    Pt s/p left shoulder steroid injection by Dr Heller with some improvement.     Pt c/o chronic LBP but no sciatica.  Pt c/o SOA when she lays down flat x few weeks and now getting worse. Pt c/o non productive cough. Pt c/o ALVES  x few weeks.  Patient reports she has some posterior left lateral rib cage pain on the right side, since the fall as she fell few weeks ago .    Discussed CT of abdomen results and fiber infiltration along the falciform and hepatic lesions probably cysts on CT scan, informed patient to discuss with the gastroenterologist during her upcoming GI visit, patient voiced understanding.    Patient however complaining of hot flashes several months and states she has gone through menopause couple years ago.      Objective   Vital Signs:  /80 (BP Location: Left arm, Patient Position: Sitting, Cuff Size: Adult)   Pulse 78   Temp 98 °F (36.7 °C) (Infrared)   Resp 18   Ht 165.1 cm (65\")   Wt 82.6 kg (182 lb)   SpO2 97%   BMI 30.29 kg/m²   Estimated body mass index is 30.29 kg/m² as calculated from the following:    Height as of this encounter: 165.1 cm (65\").    Weight as of this encounter: 82.6 kg (182 lb).               Physical Exam  Constitutional:       Appearance: Normal appearance.   HENT:      Head: Normocephalic and atraumatic.   Eyes:      Conjunctiva/sclera: Conjunctivae normal.   Cardiovascular:      Rate and Rhythm: Normal rate and regular rhythm.      Heart sounds: Normal heart sounds.   Pulmonary:      Effort: Pulmonary effort is normal.      Breath sounds: Normal breath " sounds.   Abdominal:      General: Bowel sounds are normal.      Palpations: Abdomen is soft.      Comments: Non-tender   Musculoskeletal:      Comments: Pain on palpation of posterior left lower rib cage.  Also pain in the same area with deep inspiration.  Patient coughed each time she took a deep breath.   Skin:     General: Skin is warm.   Neurological:      General: No focal deficit present.      Mental Status: She is alert and oriented to person, place, and time.   Psychiatric:         Mood and Affect: Mood normal.         Behavior: Behavior normal.        Result Review :  The following data was reviewed by: TERRELL Xiao on 12/08/2023:  Common labs          2/10/2023    15:48 11/17/2023    08:22   Common Labs   Glucose  92    BUN  14    Creatinine  0.62    Sodium  141    Potassium  4.5    Chloride  103    Calcium  9.9    Total Protein  6.6    Albumin  4.8    Total Bilirubin  <0.2    Alkaline Phosphatase  60    AST (SGOT)  10    ALT (SGPT)  11    WBC 8.2  7.60    Hemoglobin 11.5  12.1    Hematocrit 34.4  37.1    Platelets 376  336    Total Cholesterol  225    Triglycerides  76    HDL Cholesterol  72    LDL Cholesterol   140              ECG 12 Lead    Date/Time: 12/8/2023 5:05 PM  Performed by: Ben Barone APRN    Authorized by: Ben Barone APRN  Comparison: compared with previous ECG   Rhythm: sinus rhythm  Rate: normal  QRS axis: normal    Clinical impression: normal ECG            Assessment and Plan   Diagnoses and all orders for this visit:    1. Costochondritis, acute (Primary)  -     predniSONE (DELTASONE) 20 MG tablet; Take 1 tablet by mouth Daily for 5 days.  Dispense: 5 tablet; Refill: 0    2. Fatty liver  Assessment & Plan:  Discussed the importance of healthy diet, nutrition, and lifestyle. Recommend low salt, fat/cholesterol diet and avoid concentrated sweets. Encouraged DASH diet along with fresh fruits & vegetables and low fat dairy products. Counseled  patient to exercise/walk as tolerated. Avoid tobacco and alcohol use.  Avoid Acetaminophen, NSAIDs, Alcohol; and also avoid other hepatotoxic agents or OTCs without prior consultation with Provider; patient voiced  understanding.        3. Chronic midline low back pain, unspecified whether sciatica present  -     XR Spine Lumbar 2 or 3 View; Future    4. ALVES (dyspnea on exertion)  -     Ambulatory Referral to Cardiology  -     XR Chest PA & Lateral; Future  -     Adult Transthoracic Echo Complete W/ Cont if Necessary Per Protocol; Future    5. Intermittent chest pain  Assessment & Plan:  EKG done today revealed normal sinus rhythm without any ischemic changes.  Instructed patient to Return to Office as needed for persistent or new symptoms and/or go to ER for worsening symptoms, patient voiced understanding.       Orders:  -     Ambulatory Referral to Cardiology  -     ECG 12 Lead  -     Adult Transthoracic Echo Complete W/ Cont if Necessary Per Protocol; Future    6. Gastritis without bleeding, unspecified chronicity, unspecified gastritis type  Assessment & Plan:  GERD precautions: Avoid fatty, greasy, spicy food, if current tobacco use stop smoking, stay upright for one hour before lying down. Also avoid Tobacco, caffeine, soda, mint, and garlic.      Orders:  -     H. Pylori Breath Test - Breath, Lung; Future    7. Acute bronchitis, unspecified organism  -     predniSONE (DELTASONE) 20 MG tablet; Take 1 tablet by mouth Daily for 5 days.  Dispense: 5 tablet; Refill: 0  -     albuterol sulfate  (90 Base) MCG/ACT inhaler; Inhale 2 puffs Every 6 (Six) Hours As Needed for Wheezing or Shortness of Air.  Dispense: 8.7 g; Refill: 5  -     budesonide-formoterol (SYMBICORT) 160-4.5 MCG/ACT inhaler; Inhale 2 puffs 2 (Two) Times a Day.  Dispense: 1 each; Refill: 0  -     azithromycin (ZITHROMAX) 250 MG tablet; Take 2 tablets the first day, then 1 tablet daily for 4 days.  Dispense: 6 tablet; Refill: 0  -      cetirizine (zyrTEC) 10 MG tablet; Take 1 tablet by mouth Every Night.  Dispense: 30 tablet; Refill: 5  -     guaiFENesin (Mucinex) 600 MG 12 hr tablet; Take 2 tablets by mouth 2 (Two) Times a Day for 10 days. Prn congestion.  Dispense: 20 tablet; Refill: 0    8. Seasonal allergies  -     fluticasone (FLONASE) 50 MCG/ACT nasal spray; 1 spray by Each Nare route Daily.  Dispense: 16 mL; Refill: 9    9. Hot flash, menopausal  Assessment & Plan:  Instructed patient not to start Veozah until complete resolution of her cardiac and pulmonary symptoms, patient voiced understanding.    Orders:  -     Fezolinetant (Veozah) 45 MG tablet; Take 1 tablet by mouth Daily.  Dispense: 30 tablet; Refill: 3    10. Mid back pain  -     XR spine thoracic 3 vw; Future    11. Orthopnea  -     Adult Transthoracic Echo Complete W/ Cont if Necessary Per Protocol; Future    Instructed patient to Return to Office as needed for persistent or new symptoms and/or go to ER for worsening symptoms, patient voiced understanding.            Follow Up   Return in about 4 weeks (around 1/5/2024) for Recheck.  Patient was given instructions and counseling regarding her condition or for health maintenance advice. Please see specific information pulled into the AVS if appropriate.

## 2023-12-10 PROBLEM — R06.01 ORTHOPNEA: Status: ACTIVE | Noted: 2023-12-10

## 2023-12-10 NOTE — ASSESSMENT & PLAN NOTE
EKG done today revealed normal sinus rhythm without any ischemic changes.  Instructed patient to Return to Office as needed for persistent or new symptoms and/or go to ER for worsening symptoms, patient voiced understanding.

## 2023-12-10 NOTE — ASSESSMENT & PLAN NOTE
Instructed patient not to start Veozah until complete resolution of her cardiac and pulmonary symptoms, patient voiced understanding.

## 2023-12-11 ENCOUNTER — HOSPITAL ENCOUNTER (OUTPATIENT)
Dept: GENERAL RADIOLOGY | Facility: HOSPITAL | Age: 53
Discharge: HOME | End: 2023-12-11
Payer: COMMERCIAL

## 2023-12-11 DIAGNOSIS — G89.29 CHRONIC MIDLINE LOW BACK PAIN, UNSPECIFIED WHETHER SCIATICA PRESENT: ICD-10-CM

## 2023-12-11 DIAGNOSIS — M54.9 MID BACK PAIN: ICD-10-CM

## 2023-12-11 DIAGNOSIS — R06.09 DOE (DYSPNEA ON EXERTION): ICD-10-CM

## 2023-12-11 DIAGNOSIS — M54.50 CHRONIC MIDLINE LOW BACK PAIN, UNSPECIFIED WHETHER SCIATICA PRESENT: ICD-10-CM

## 2023-12-11 PROCEDURE — 72100 X-RAY EXAM L-S SPINE 2/3 VWS: CPT

## 2023-12-11 PROCEDURE — 72072 X-RAY EXAM THORAC SPINE 3VWS: CPT

## 2023-12-11 PROCEDURE — 71046 X-RAY EXAM CHEST 2 VIEWS: CPT

## 2023-12-13 DIAGNOSIS — G89.29 CHRONIC MIDLINE LOW BACK PAIN WITHOUT SCIATICA: Primary | ICD-10-CM

## 2023-12-13 DIAGNOSIS — G89.29 CHRONIC MID BACK PAIN: ICD-10-CM

## 2023-12-13 DIAGNOSIS — M54.9 CHRONIC MID BACK PAIN: ICD-10-CM

## 2023-12-13 DIAGNOSIS — M54.50 CHRONIC MIDLINE LOW BACK PAIN WITHOUT SCIATICA: Primary | ICD-10-CM

## 2023-12-13 PROBLEM — Z12.31 BREAST CANCER SCREENING BY MAMMOGRAM: Status: RESOLVED | Noted: 2023-10-27 | Resolved: 2023-12-13

## 2023-12-13 PROBLEM — Z13.220 LIPID SCREENING: Status: RESOLVED | Noted: 2023-10-27 | Resolved: 2023-12-13

## 2023-12-15 ENCOUNTER — HOSPITAL ENCOUNTER (OUTPATIENT)
Dept: ULTRASOUND IMAGING | Facility: HOSPITAL | Age: 53
Discharge: HOME | End: 2023-12-15
Admitting: STUDENT IN AN ORGANIZED HEALTH CARE EDUCATION/TRAINING PROGRAM
Payer: COMMERCIAL

## 2023-12-15 DIAGNOSIS — R10.33 PERIUMBILICAL ABDOMINAL PAIN: ICD-10-CM

## 2023-12-15 PROCEDURE — 76700 US EXAM ABDOM COMPLETE: CPT

## 2023-12-29 ENCOUNTER — HOSPITAL ENCOUNTER (OUTPATIENT)
Dept: CARDIOLOGY | Facility: HOSPITAL | Age: 53
Discharge: HOME | End: 2023-12-29
Payer: COMMERCIAL

## 2023-12-29 VITALS
HEART RATE: 96 BPM | WEIGHT: 165 LBS | BODY MASS INDEX: 26.52 KG/M2 | HEIGHT: 66 IN | DIASTOLIC BLOOD PRESSURE: 70 MMHG | SYSTOLIC BLOOD PRESSURE: 111 MMHG

## 2023-12-29 DIAGNOSIS — R06.01 ORTHOPNEA: ICD-10-CM

## 2023-12-29 DIAGNOSIS — R07.9 INTERMITTENT CHEST PAIN: ICD-10-CM

## 2023-12-29 DIAGNOSIS — R06.09 DOE (DYSPNEA ON EXERTION): ICD-10-CM

## 2023-12-29 LAB
AORTIC ARCH: 2.5 CM
AORTIC DIMENSIONLESS INDEX: 0.8 (DI)
ASCENDING AORTA: 2.7 CM
BH CV ECHO MEAS - AO MAX PG: 10.1 MMHG
BH CV ECHO MEAS - AO MEAN PG: 6 MMHG
BH CV ECHO MEAS - AO V2 MAX: 159 CM/SEC
BH CV ECHO MEAS - AO V2 VTI: 30.3 CM
BH CV ECHO MEAS - AVA(I,D): 2.23 CM2
BH CV ECHO MEAS - EDV(CUBED): 77.7 ML
BH CV ECHO MEAS - EDV(MOD-SP2): 77 ML
BH CV ECHO MEAS - EDV(MOD-SP4): 91 ML
BH CV ECHO MEAS - EF(MOD-BP): 66.3 %
BH CV ECHO MEAS - EF(MOD-SP2): 67.5 %
BH CV ECHO MEAS - EF(MOD-SP4): 67 %
BH CV ECHO MEAS - ESV(CUBED): 19.7 ML
BH CV ECHO MEAS - ESV(MOD-SP2): 25 ML
BH CV ECHO MEAS - ESV(MOD-SP4): 30 ML
BH CV ECHO MEAS - FS: 36.7 %
BH CV ECHO MEAS - IVS/LVPW: 0.84 CM
BH CV ECHO MEAS - IVSD: 0.68 CM
BH CV ECHO MEAS - LAT PEAK E' VEL: 11.6 CM/SEC
BH CV ECHO MEAS - LV DIASTOLIC VOL/BSA (35-75): 49.4 CM2
BH CV ECHO MEAS - LV MASS(C)D: 95.1 GRAMS
BH CV ECHO MEAS - LV MAX PG: 6 MMHG
BH CV ECHO MEAS - LV MEAN PG: 3 MMHG
BH CV ECHO MEAS - LV SYSTOLIC VOL/BSA (12-30): 16.3 CM2
BH CV ECHO MEAS - LV V1 MAX: 122 CM/SEC
BH CV ECHO MEAS - LV V1 VTI: 22.9 CM
BH CV ECHO MEAS - LVIDD: 4.3 CM
BH CV ECHO MEAS - LVIDS: 2.7 CM
BH CV ECHO MEAS - LVOT AREA: 2.9 CM2
BH CV ECHO MEAS - LVOT DIAM: 1.94 CM
BH CV ECHO MEAS - LVPWD: 0.81 CM
BH CV ECHO MEAS - MED PEAK E' VEL: 10.7 CM/SEC
BH CV ECHO MEAS - MV A DUR: 0.1 SEC
BH CV ECHO MEAS - MV A MAX VEL: 98.5 CM/SEC
BH CV ECHO MEAS - MV DEC SLOPE: 506.8 CM/SEC2
BH CV ECHO MEAS - MV DEC TIME: 0.21 SEC
BH CV ECHO MEAS - MV E MAX VEL: 75.4 CM/SEC
BH CV ECHO MEAS - MV E/A: 0.77
BH CV ECHO MEAS - MV MAX PG: 4.4 MMHG
BH CV ECHO MEAS - MV MEAN PG: 2.11 MMHG
BH CV ECHO MEAS - MV P1/2T: 54.9 MSEC
BH CV ECHO MEAS - MV V2 VTI: 19.1 CM
BH CV ECHO MEAS - MVA(P1/2T): 4 CM2
BH CV ECHO MEAS - MVA(VTI): 3.5 CM2
BH CV ECHO MEAS - PA ACC TIME: 0.11 SEC
BH CV ECHO MEAS - PI END-D VEL: 89.6 CM/SEC
BH CV ECHO MEAS - RAP SYSTOLE: 3 MMHG
BH CV ECHO MEAS - RVSP: 21.6 MMHG
BH CV ECHO MEAS - SI(MOD-SP2): 28.2 ML/M2
BH CV ECHO MEAS - SI(MOD-SP4): 33.1 ML/M2
BH CV ECHO MEAS - SUP REN AO DIAM: 1.6 CM
BH CV ECHO MEAS - SV(LVOT): 67.5 ML
BH CV ECHO MEAS - SV(MOD-SP2): 52 ML
BH CV ECHO MEAS - SV(MOD-SP4): 61 ML
BH CV ECHO MEAS - TAPSE (>1.6): 2.48 CM
BH CV ECHO MEAS - TR MAX PG: 18.6 MMHG
BH CV ECHO MEAS - TR MAX VEL: 215.8 CM/SEC
BH CV ECHO MEASUREMENTS AVERAGE E/E' RATIO: 6.76
BH CV XLRA - RV BASE: 3.1 CM
BH CV XLRA - RV LENGTH: 6.4 CM
BH CV XLRA - RV MID: 2.21 CM
BH CV XLRA - TDI S': 12.2 CM/SEC
LEFT ATRIUM VOLUME INDEX: 16.7 ML/M2
SINUS: 2.8 CM
STJ: 2.28 CM

## 2023-12-29 PROCEDURE — 25510000001 PERFLUTREN (DEFINITY) 8.476 MG IN SODIUM CHLORIDE (PF) 0.9 % 10 ML INJECTION: Performed by: STUDENT IN AN ORGANIZED HEALTH CARE EDUCATION/TRAINING PROGRAM

## 2023-12-29 PROCEDURE — 93306 TTE W/DOPPLER COMPLETE: CPT

## 2023-12-29 RX ADMIN — SODIUM CHLORIDE 3 ML: 9 INJECTION INTRAMUSCULAR; INTRAVENOUS; SUBCUTANEOUS at 16:06

## 2024-01-05 ENCOUNTER — OFFICE VISIT (OUTPATIENT)
Dept: GASTROENTEROLOGY | Facility: CLINIC | Age: 54
End: 2024-01-05
Payer: COMMERCIAL

## 2024-01-05 VITALS
HEIGHT: 66 IN | TEMPERATURE: 97 F | HEART RATE: 88 BPM | WEIGHT: 184.8 LBS | BODY MASS INDEX: 29.7 KG/M2 | SYSTOLIC BLOOD PRESSURE: 118 MMHG | DIASTOLIC BLOOD PRESSURE: 75 MMHG

## 2024-01-05 DIAGNOSIS — R19.8 ABDOMINAL FULLNESS: ICD-10-CM

## 2024-01-05 DIAGNOSIS — R10.9 ABDOMINAL DISCOMFORT: ICD-10-CM

## 2024-01-05 DIAGNOSIS — R14.0 ABDOMINAL BLOATING: Primary | ICD-10-CM

## 2024-01-05 DIAGNOSIS — K59.04 CHRONIC IDIOPATHIC CONSTIPATION: ICD-10-CM

## 2024-01-05 RX ORDER — OMEGA-3-ACID ETHYL ESTERS 1 G/1
CAPSULE, LIQUID FILLED ORAL
COMMUNITY
Start: 2023-11-20

## 2024-01-05 RX ORDER — LUBIPROSTONE 8 UG/1
8 CAPSULE ORAL 2 TIMES DAILY WITH MEALS
Qty: 60 CAPSULE | Refills: 3 | Status: SHIPPED | OUTPATIENT
Start: 2024-01-05

## 2024-01-05 NOTE — PROGRESS NOTES
"Chief Complaint  Abdominal Pain, Constipation, and Nausea    Subjective          History of Present Illness    Maryana Huber is a  53 y.o. female presents for follow-up today.  She is a patient of Dr. Pb Pettit and is new to me.  She was last seen by Dr. Pettit in the office on 3/7/2022 for hemorrhoids and abdominal pain.  Following this appointment she had a CT scan done which was unremarkable other than a benign cyst of the liver. She was scheduled for colonoscopy it appears this was never done.    Per chart review she is continuing to have abdominal pain.  Workup has included repeat CT scan done the beginning of December which was unremarkable.  She also underwent abdominal ultrasound 12/15/2023 which showed mild hepatomegaly, no gallstones or biliary ductal dilation.  Noted that liver is normal in echogenicity.  She had H. pylori breath test done which was negative.  Reviewed her CBC and CMP done 11/17/2023 which are unremarkable..    Today, patient reports that she does have a history of constipation - takes Linzess 72mcg. Only able to take Linzess every 2-3 days because it causes 2-3 hours of diarrhea each time she takes it.  She says that on days where she does not take Linzess she does not have a bowel movement and so she feels like she is stuck in a cycle be constipated and then having lots of diarrhea when she takes this medication.  She has never tried Amitiza before.  She said that she was supposed to have colonoscopy done last year but never scheduled this as she is hesitant to be put under anesthesia.  She denies any weight loss, decreased appetite, nausea, vomiting, black or bloody stools, hematemesis, coffee-ground emesis, difficulty swallowing.  She does report a \"fullness\" in her abdomen after eating especially big meals.  She denies any reflux symptoms while taking Nexium 40 mg daily.  She denies any prior EGD or colonoscopy.  She used to take Metamucil in the past but has not " "recently.      Objective   Vital Signs:   /75   Pulse 88   Temp 97 °F (36.1 °C)   Ht 167.6 cm (66\")   Wt 83.8 kg (184 lb 12.8 oz)   BMI 29.83 kg/m²       Physical Exam  Constitutional:       General: She is not in acute distress.     Appearance: Normal appearance.   Eyes:      General: No scleral icterus.  Cardiovascular:      Rate and Rhythm: Normal rate.   Pulmonary:      Effort: Pulmonary effort is normal.   Abdominal:      General: Abdomen is flat. Bowel sounds are normal. There is no distension.      Tenderness: There is no guarding.      Comments: General abdominal tenderness   Skin:     Coloration: Skin is not jaundiced.   Neurological:      General: No focal deficit present.      Mental Status: She is alert and oriented to person, place, and time.   Psychiatric:         Mood and Affect: Mood normal.         Behavior: Behavior normal.          Result Review :   The following data was reviewed by: Svitlana High PA-C on 01/05/2024:  CMP          11/17/2023    08:22   CMP   Glucose 92    BUN 14    Creatinine 0.62    Sodium 141    Potassium 4.5    Chloride 103    Calcium 9.9    Total Protein 6.6    Albumin 4.8    Globulin 1.8    Total Bilirubin <0.2    Alkaline Phosphatase 60    AST (SGOT) 10    ALT (SGPT) 11    BUN/Creatinine Ratio 22.6      CBC          2/10/2023    15:48 11/17/2023    08:22   CBC   WBC 8.2  7.60    RBC 3.92  4.14    Hemoglobin 11.5  12.1    Hematocrit 34.4  37.1    MCV 88  89.6    MCH 29.3  29.2    MCHC 33.4  32.6    RDW 12.7  12.8    Platelets 376  336              Assessment:   Diagnoses and all orders for this visit:    1. Abdominal bloating (Primary)  -     Case Request; Standing    2. Abdominal discomfort  -     Case Request; Standing    3. Chronic idiopathic constipation  -     lubiprostone (Amitiza) 8 MCG capsule; Take 1 capsule by mouth 2 (Two) Times a Day With Meals.  Dispense: 60 capsule; Refill: 3  -     Case Request; Standing    4. Abdominal fullness  -     Case " Request; Standing    Other orders  -     Implement Anesthesia orders day of procedure.; Standing  -     Follow Anesthesia Guidelines / Protocol; Future  -     Obtain Informed Consent; Future          Plan:   Linzess appears to be too strong as it causes multiple episodes of diarrhea every time she takes it.  I think that some of her abdominal pain might be relieved by getting her on more of a regular bowel schedule.  She has never tried Amitiza so can try Amitiza 8 mcg twice daily and see if she is able to tolerate that more regularly.  I also had a lengthy discussion with her encouraging colonoscopy given ongoing symptoms to take a look for any kind inflammation that may be contributing.  Given her feeling of fullness after eating I think that an EGD would be useful as well these could both be done at the same time. Risks (including, but not limited to perforation, bleeding, sedation-related complications, and death), benefits, and alternatives to the procedure were discussed with the patient and all questions were answered.     Further recommendations and follow-up will be based on endoscopic findings.      Follow Up   No follow-ups on file.    Dragon dictation used throughout this note.         Svitlana High PA-C  [facility]  [address] [place] [address]  Office: [phone]

## 2024-01-05 NOTE — Clinical Note
53-year-old female presenting today for follow-up regarding abdominal discomfort, bloating and constipation.  She has undergone workup including CT abdomen pelvis and ultrasound which showed hepatomegaly but normal echogenicity.  Labs have been unremarkable.  She is on Linzess but this causes diarrhea so I switched her to Amitiza to see if she tolerates that better.  She reports abdominal fullness after eating so I recommended EGD in addition to colonoscopy.

## 2024-01-10 ENCOUNTER — TELEPHONE (OUTPATIENT)
Dept: GASTROENTEROLOGY | Facility: CLINIC | Age: 54
End: 2024-01-10
Payer: COMMERCIAL

## 2024-01-10 NOTE — TELEPHONE ENCOUNTER
PT IS GOING TO WAIT UNTIL LATER ON ONCE SOME PERSONAL THINGS WRAP UP AND WILL CALL US WHEN SHE IS READY

## 2024-01-12 ENCOUNTER — OFFICE VISIT (OUTPATIENT)
Age: 54
End: 2024-01-12
Payer: COMMERCIAL

## 2024-01-12 VITALS
SYSTOLIC BLOOD PRESSURE: 134 MMHG | DIASTOLIC BLOOD PRESSURE: 76 MMHG | WEIGHT: 181 LBS | HEIGHT: 66 IN | HEART RATE: 82 BPM | OXYGEN SATURATION: 100 % | BODY MASS INDEX: 29.09 KG/M2

## 2024-01-12 DIAGNOSIS — G89.29 CHRONIC CHEST PAIN WITH LOW TO MODERATE RISK FOR CAD: Primary | ICD-10-CM

## 2024-01-12 DIAGNOSIS — Z91.89 CHRONIC CHEST PAIN WITH LOW TO MODERATE RISK FOR CAD: Primary | ICD-10-CM

## 2024-01-12 DIAGNOSIS — R07.9 CHRONIC CHEST PAIN WITH LOW TO MODERATE RISK FOR CAD: Primary | ICD-10-CM

## 2024-01-12 PROCEDURE — 93000 ELECTROCARDIOGRAM COMPLETE: CPT | Performed by: INTERNAL MEDICINE

## 2024-01-12 PROCEDURE — 99204 OFFICE O/P NEW MOD 45 MIN: CPT | Performed by: INTERNAL MEDICINE

## 2024-01-12 NOTE — PROGRESS NOTES
Subjective:     Encounter Date:01/12/2024      Patient ID: Maryana Huber is a 53 y.o. female.    Chief Complaint:  HPI:   Chest pain.      The foll this is a 53-year-old woman with hypertension and hyperlipidemia which is untreated.  She presents for evaluation of chest pain.  Over the last months she has noticed some sharp stabbing chest pain and pressure chest pain intermittently.  It occurs when she is exercising.  She was previously able to run on the treadmill for about 10 minutes but recently has not been able to due to the pain.  She is short of breath as well.  She did sustain a fall in the [employer] parking lot during which she broke her shoulder. This happened about 6 weeks ago, however the chest pain started prior to that.  She denies palpitations.  No rest symptoms.  No lower extremity edema orthopnea.  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  She is pretty sedentary.  She is trying to lose weight.  She is currently taking fish oil for hyperlipidemia, in the past she took atorvastatin but had abdominal cramps with it.      owing portions of the patient's history were reviewed and updated as appropriate: allergies, current medications, past family history, past medical history, past social history, past surgical history and problem list.     REVIEW OF SYSTEMS:   All systems reviewed.  Pertinent positives identified in HPI.  All other systems are negative.    Past Medical History:   Diagnosis Date    Abdominal cramping 07/17/2017    SEEN AT [facility] ER    Anemia     Anxiety     Arthralgia of left temporomandibular joint 06/2021    Atypical chest pain 08/30/2015    SEEN AT [facility] ER    Breast asymmetry     Carpal tunnel syndrome, bilateral     Cervical ca 2010    S/P LEEP    Chest pain on breathing 01/2019    Chronic neck pain     Constipation     Cystitis 03/2019    Depression     Dysuria     Fibrocystic breast     GERD (gastroesophageal reflux disease)     Hemorrhoids     Hypertension      Hypoglycemia 07/2012    IBS (irritable bowel syndrome)     Influenza 02/12/2018    Left breast mass 12/17/2019    Left-sided chest pain 12/2019    Migraine     Nodule of anus 10/2018    Occipital headache 11/24/2017    Pelvic pain     Periodic headache syndrome 10/2019    Right ankle sprain 08/30/2011    SEEN AT [facility] ER    Rosacea     Urgency of micturition     Vitamin B 12 deficiency        Family History   Problem Relation Age of Onset    Colon polyps Mother     Colon polyps Father     Cancer Maternal Grandmother     Cancer Maternal Grandfather     No Known Problems Son     Colon cancer Maternal Aunt        Social History     Socioeconomic History    Marital status:    Tobacco Use    Smoking status: Never    Smokeless tobacco: Never   Vaping Use    Vaping Use: Never used   Substance and Sexual Activity    Alcohol use: No    Drug use: No    Sexual activity: Yes     Partners: Male     Comment: .       Allergies   Allergen Reactions    Clarithromycin GI Intolerance     Stomach cramps     Levofloxacin Nausea And Vomiting    Nitrofurantoin Monohyd Macro Nausea And Vomiting    Trazodone And Nefazodone Confusion     NIGHTMARES    Hydrocodone Unknown - Low Severity     Nausea    Tetracycline Hcl Unknown - Low Severity       Past Surgical History:   Procedure Laterality Date    APPENDECTOMY N/A     BREAST BIOPSY      BREAST LUMPECTOMY Right     BENIGN    COLONOSCOPY N/A 12/19/2011    ENTIRE COLON WNL, RESCOPE IN 5 YRS, DR. JAMAR BELL AT [facility]    ENDOSCOPY N/A 06/14/2011    NORMAL ESOPHAGUS, NORMAL DUODENUM, A FEW BENIGN GASTRIC POLYPS, DR. DAVID SANCHEZ AT [facility]    FACIAL LACERATIONS REPAIR Right 07/06/2011    RIGHT EYEBROWN, DONE AT [facility] ER    LEEP N/A 08/16/2010    FOR SEVERE DYSPLASIA OF CERVIX, LARGE AREA OF NON UPTAKE IN THE POSTERIOR LIP OF THE CERVIX, AND A SMALL AREA ON THE ANTERIOR LIP OF THE CERVIX, DR. KAYLEIGH GHOTRA AT [facility]    VAGINAL DELIVERY N/A 02/01/2001    DR. YESSENIA LEONGING         ECG 12  Lead    Date/Time: 1/12/2024 3:55 PM  Performed by: Thao Villaseñor MD    Authorized by: Thao Villaseñor MD  Comparison: not compared with previous ECG   Rhythm: sinus rhythm  Rate: normal  Conduction: conduction normal  QRS axis: normal  Other findings: non-specific ST-T wave changes    Clinical impression: non-specific ECG             Objective:         PHYSICAL EXAM:  GEN: VSS, no distress,   Eyes: normal sclera, normal lids and lashes  HENT: moist mucus membranes,   Respiratory: CTAB, no rales or wheezes  CV: RRR, no murmurs, , +2 DP and 2+ carotid pulses b/l  GI: NABS, soft,  Nontender, nondistended  MSK: no edema, no scoliosis or kyphosis  Skin: no rash, warm, dry  Heme/Lymph: no bruising or bleeding  Psych: organized thought, normal behavior and affect  Neuro: Cranial nerves grossly intact, Alert and Oriented x 3.         Assessment:          Diagnosis Plan   1. Chronic chest pain with low to moderate risk for CAD  Treadmill Stress Test             Plan:       1.  Chest pain: Typical and atypical symptoms.  She does have an exertional component which is new over the last month.  Will plan treadmill stress test.  Risk factors of hypertension and hyperlipidemia.  Non-smoker with no family history.    Ben,  thank you very much for referring this kind patient to me. Please call me with any questions or concerns. I will see the patient again in the office in 6 months.          Thao Villaseñor MD  01/12/24  [facility]    Outpatient Encounter Medications as of 1/12/2024   Medication Sig Dispense Refill    acetaminophen (Tylenol 8 Hour) 650 MG 8 hr tablet Take 1 tablet by mouth Every 8 (Eight) Hours As Needed for Moderate Pain. 30 tablet 1    albuterol sulfate  (90 Base) MCG/ACT inhaler Inhale 2 puffs Every 6 (Six) Hours As Needed for Wheezing or Shortness of Air. 8.7 g 5    azithromycin (ZITHROMAX) 250 MG tablet 2 tablets first day, then 1 daily for 4 days 6 tablet 0    clobetasol (TEMOVATE)  0.05 % external solution       Diclofenac Sodium (VOLTAREN) 1 % gel gel Apply 4 g topically to the appropriate area as directed 2 (Two) Times a Day As Needed (MSK pain) for up to 90 days. 100 g 2    esomeprazole (nexIUM) 40 MG capsule Take 1 capsule by mouth Daily. 90 capsule 0    Fezolinetant (Veozah) 45 MG tablet Take 1 tablet by mouth Daily. 30 tablet 3    fish oil-omega-3 fatty acids 1000 MG capsule Take 1 capsule by mouth Daily. 180 capsule 3    fluticasone (FLONASE) 50 MCG/ACT nasal spray 1 spray by Each Nare route Daily. 16 mL 9    hydrocortisone 2.5 % lotion       ibuprofen (ADVIL,MOTRIN) 600 MG tablet Take 1 tablet by mouth Every 8 (Eight) Hours As Needed for Mild Pain. 40 tablet 0    ketoconazole (NIZORAL) 2 % shampoo       lubiprostone (Amitiza) 8 MCG capsule Take 1 capsule by mouth 2 (Two) Times a Day With Meals. 60 capsule 3    metroNIDAZOLE (METROGEL) 0.75 % gel       multivitamin with minerals (Multivitamin Adult) tablet tablet Take 1 tablet by mouth Daily for 360 days. 90 tablet 3    omega-3 acid ethyl esters (LOVAZA) 1 g capsule       predniSONE (DELTASONE) 10 MG tablet Take 3 tablets by mouth Daily for 5 days. 15 tablet 0    promethazine-dextromethorphan (PROMETHAZINE-DM) 6.25-15 MG/5ML syrup Take 5 mL by mouth 4 (Four) Times a Day As Needed for Cough. 200 mL 0    Sodium Fluoride 5000 PPM 1.1 % paste       Soolantra 1 % cream       spironolactone (ALDACTONE) 25 MG tablet       SUMAtriptan (Imitrex) 50 MG tablet Take one tablet at onset of headache. May repeat dose one time in 2 hours if headache not relieved. 9 tablet 3    tretinoin (RETIN-A) 0.025 % cream       valACYclovir (VALTREX) 1000 MG tablet Take 1 tablet by mouth 3 (Three) Times a Day. Prn cold sore 9 tablet 3    valsartan (DIOVAN) 160 MG tablet Take 1 tablet by mouth Daily. 90 tablet 0    vitamin D (ERGOCALCIFEROL) 1.25 MG (02963 UT) capsule capsule Take 1 capsule by mouth 1 (One) Time Per Week. 16 capsule 3     No facility-administered  encounter medications on file as of 1/12/2024.

## 2024-01-25 ENCOUNTER — TELEPHONE (OUTPATIENT)
Dept: CARDIOLOGY | Facility: CLINIC | Age: 54
End: 2024-01-25

## 2024-01-25 NOTE — TELEPHONE ENCOUNTER
Hub staff attempted to follow warm transfer process and was unsuccessful     Caller: Maryana Huber    Relationship to patient: Self    Best call back number: [phone]    Patient is needing: PT IS NEEDING TO CANCEL STRESS TEST APPT TOMORROW 1.26.24. SHE WILL CALL BACK TO R/S ONCE HER INSURANCE IS STRAIGHTENED OUT. PLEASE CANCEL THIS ASAP.

## 2024-02-02 ENCOUNTER — TELEPHONE (OUTPATIENT)
Dept: FAMILY MEDICINE CLINIC | Facility: CLINIC | Age: 54
End: 2024-02-02

## 2024-02-02 DIAGNOSIS — R94.6 ABNORMAL THYROID FUNCTION TEST: Primary | ICD-10-CM

## 2024-02-02 NOTE — TELEPHONE ENCOUNTER
Caller: Maryana Huber    Relationship: Self    Best call back number: [phone]    What is the best time to reach you: NOON TODAY       What was the call regarding: CHECKING STATUS OF LAB ORDERS FOR THYROID    WOULD LIKE TO SCHEDULE LABS TODAY AROUND 1:00 IF ORDERS ARE IN

## 2024-02-03 LAB
T4 FREE SERPL-MCNC: 1.1 NG/DL (ref 0.82–1.77)
TSH SERPL DL<=0.005 MIU/L-ACNC: 2.41 UIU/ML (ref 0.45–4.5)

## 2024-02-09 ENCOUNTER — OFFICE VISIT (OUTPATIENT)
Dept: FAMILY MEDICINE CLINIC | Facility: CLINIC | Age: 54
End: 2024-02-09
Payer: COMMERCIAL

## 2024-02-09 VITALS
HEART RATE: 85 BPM | TEMPERATURE: 98.1 F | WEIGHT: 183.4 LBS | OXYGEN SATURATION: 98 % | HEIGHT: 66 IN | SYSTOLIC BLOOD PRESSURE: 124 MMHG | BODY MASS INDEX: 29.47 KG/M2 | DIASTOLIC BLOOD PRESSURE: 76 MMHG

## 2024-02-09 DIAGNOSIS — K58.9 IRRITABLE BOWEL SYNDROME, UNSPECIFIED TYPE: ICD-10-CM

## 2024-02-09 DIAGNOSIS — I10 ESSENTIAL HYPERTENSION: Chronic | ICD-10-CM

## 2024-02-09 DIAGNOSIS — E78.2 MIXED HYPERLIPIDEMIA: Chronic | ICD-10-CM

## 2024-02-09 DIAGNOSIS — J30.89 ENVIRONMENTAL AND SEASONAL ALLERGIES: ICD-10-CM

## 2024-02-09 DIAGNOSIS — K21.9 GASTROESOPHAGEAL REFLUX DISEASE, UNSPECIFIED WHETHER ESOPHAGITIS PRESENT: Chronic | ICD-10-CM

## 2024-02-09 DIAGNOSIS — G43.009 MIGRAINE WITHOUT AURA AND WITHOUT STATUS MIGRAINOSUS, NOT INTRACTABLE: Chronic | ICD-10-CM

## 2024-02-09 DIAGNOSIS — Z00.00 ANNUAL PHYSICAL EXAM: Primary | ICD-10-CM

## 2024-02-09 DIAGNOSIS — Z12.11 COLON CANCER SCREENING: ICD-10-CM

## 2024-02-09 PROBLEM — J20.9 ACUTE BRONCHITIS: Status: RESOLVED | Noted: 2023-12-08 | Resolved: 2024-02-09

## 2024-02-09 PROBLEM — M94.0 COSTOCHONDRITIS, ACUTE: Status: RESOLVED | Noted: 2023-12-08 | Resolved: 2024-02-09

## 2024-02-09 RX ORDER — AMOXICILLIN 500 MG/1
500 CAPSULE ORAL 2 TIMES DAILY
COMMUNITY
Start: 2024-01-23 | End: 2024-02-09

## 2024-02-09 RX ORDER — MONTELUKAST SODIUM 10 MG/1
10 TABLET ORAL NIGHTLY
Qty: 90 TABLET | Refills: 3 | Status: SHIPPED | OUTPATIENT
Start: 2024-02-09

## 2024-02-09 RX ORDER — ESOMEPRAZOLE MAGNESIUM 40 MG/1
40 CAPSULE, DELAYED RELEASE ORAL DAILY
Qty: 90 CAPSULE | Refills: 0 | Status: SHIPPED | OUTPATIENT
Start: 2024-02-09

## 2024-02-09 RX ORDER — LINACLOTIDE 72 UG/1
72 CAPSULE, GELATIN COATED ORAL
COMMUNITY
Start: 2024-01-23 | End: 2024-02-09 | Stop reason: SDUPTHER

## 2024-02-09 RX ORDER — VALSARTAN 160 MG/1
160 TABLET ORAL DAILY
Qty: 90 TABLET | Refills: 1 | Status: SHIPPED | OUTPATIENT
Start: 2024-02-09

## 2024-02-09 RX ORDER — LINACLOTIDE 72 UG/1
72 CAPSULE, GELATIN COATED ORAL
Qty: 30 CAPSULE | Refills: 5 | Status: SHIPPED | OUTPATIENT
Start: 2024-02-09

## 2024-02-09 RX ORDER — SUMATRIPTAN 50 MG/1
TABLET, FILM COATED ORAL
Qty: 9 TABLET | Refills: 5 | Status: SHIPPED | OUTPATIENT
Start: 2024-02-09

## 2024-02-09 RX ORDER — MONTELUKAST SODIUM 10 MG/1
10 TABLET ORAL NIGHTLY
COMMUNITY
Start: 2024-01-23 | End: 2024-02-09 | Stop reason: SDUPTHER

## 2024-02-09 NOTE — PROGRESS NOTES
"Chief Complaint  level normal, but feeling sluggish hot flashes starting med and f/u after tests/labs done    Subjective        Maryana Huber presents to [facility]  History of Present Illness  54-year-old white female with a history of hypertension, seasonal allergies, GERD, constipation is here for follow-up visit and discussion of her recent labs and follow-ups and annual wellness visit.    Pt reports s/p orthopedics and was informed possible small rotator cuff tear on left and rec conservative mgmt.  Pt s/p GI for liver cysts was informed benign and rec annual f/u.    Pt reports had covid dx 3 weeks ago and had loss of taste.     Instructed patient to get the adult preventive immunization that is due the pharmacy- tdap, prevnar, & shingles.  Rec f/u Everett Ibarra for routine PAP smear.            Objective   Vital Signs:  /76   Pulse 85   Temp 98.1 °F (36.7 °C)   Ht 167.6 cm (66\")   Wt 83.2 kg (183 lb 6.4 oz)   SpO2 98%   BMI 29.60 kg/m²   Estimated body mass index is 29.6 kg/m² as calculated from the following:    Height as of this encounter: 167.6 cm (66\").    Weight as of this encounter: 83.2 kg (183 lb 6.4 oz).               Physical Exam  Constitutional:       Appearance: Normal appearance.   HENT:      Head: Normocephalic and atraumatic.   Eyes:      Conjunctiva/sclera: Conjunctivae normal.   Cardiovascular:      Rate and Rhythm: Normal rate and regular rhythm.      Heart sounds: Normal heart sounds.   Pulmonary:      Effort: Pulmonary effort is normal.      Breath sounds: Normal breath sounds.   Abdominal:      General: Bowel sounds are normal.      Palpations: Abdomen is soft.      Comments: Non-tender   Skin:     General: Skin is warm.   Neurological:      General: No focal deficit present.      Mental Status: She is alert and oriented to person, place, and time.   Psychiatric:         Mood and Affect: Mood normal.         Behavior: Behavior normal.      "   Result Review :    The following data was reviewed by: TERRELL Xiao on 02/09/2024:  CMP          11/17/2023    08:22   CMP   Glucose 92    BUN 14    Creatinine 0.62    Sodium 141    Potassium 4.5    Chloride 103    Calcium 9.9    Total Protein 6.6    Albumin 4.8    Globulin 1.8    Total Bilirubin <0.2    Alkaline Phosphatase 60    AST (SGOT) 10    ALT (SGPT) 11    BUN/Creatinine Ratio 22.6      CBC          11/17/2023    08:22   CBC   WBC 7.60    RBC 4.14    Hemoglobin 12.1    Hematocrit 37.1    MCV 89.6    MCH 29.2    MCHC 32.6    RDW 12.8    Platelets 336      Lipid Panel          11/17/2023    08:22   Lipid Panel   Total Cholesterol 225    Triglycerides 76    HDL Cholesterol 72    VLDL Cholesterol 13    LDL Cholesterol  140      TSH          11/17/2023    08:22 2/2/2024    14:19   TSH   TSH 4.490  2.410          Data reviewed : Radiologic studies reviewed chest x-ray report from January 4, 2024 discussed the results of the spine from December 8, 2023 which revealed facet hypertrophy and lumbar spine discussed with patient today also reviewed ultrasound of abdomen report from December 5, 2023, Consultant notes reviewed cardiology consult note from January 12, 2024 and the plan for a treadmill stress test, and Recent hospitalization notes reviewed ER note from January 14, 2024 where patient was diagnosed with COVID             Assessment and Plan     Diagnoses and all orders for this visit:    1. Annual physical exam (Primary)  Assessment & Plan:  Counseling was provided on nutrition, physical activity, development, and injury prevention, dental health, and safe sex practices patient verbalizes understanding no additional questions were asked.           2. Essential hypertension  Assessment & Plan:  Hypertension is improving with treatment.  Continue current treatment regimen.  Dietary sodium restriction.  Continue current medications.  Ambulatory blood pressure monitoring.  Blood pressure will  be reassessed in 3 months.  Discussed the importance of healthy diet, nutrition, and lifestyle. Recommend low salt, fat/cholesterol diet and avoid concentrated sweets. Encouraged DASH diet along with fresh fruits & vegetables and low fat dairy products. Counseled patient to exercise/walk as tolerated. Avoid tobacco and alcohol use.      Orders:  -     valsartan (DIOVAN) 160 MG tablet; Take 1 tablet by mouth Daily.  Dispense: 90 tablet; Refill: 1    3. Migraine without aura and without status migrainosus, not intractable  Assessment & Plan:  Stable on current regimen.      Orders:  -     SUMAtriptan (Imitrex) 50 MG tablet; Take one tablet at onset of headache. May repeat dose one time in 2 hours if headache not relieved.  Dispense: 9 tablet; Refill: 5    4. Gastroesophageal reflux disease, unspecified whether esophagitis present  Assessment & Plan:  Stable.    Orders:  -     esomeprazole (nexIUM) 40 MG capsule; Take 1 capsule by mouth Daily.  Dispense: 90 capsule; Refill: 0    5. Colon cancer screening  -     Cologuard - Stool, Per Rectum; Future    6. Environmental and seasonal allergies  -     montelukast (SINGULAIR) 10 MG tablet; Take 1 tablet by mouth Every Night.  Dispense: 90 tablet; Refill: 3    7. Mixed hyperlipidemia  Assessment & Plan:  Discussed the importance of healthy diet, nutrition, and lifestyle. Recommend low salt, fat/cholesterol diet and avoid concentrated sweets. Encouraged DASH diet along with fresh fruits & vegetables and low fat dairy products. Counseled patient to exercise/walk as tolerated. Avoid tobacco and alcohol use.        8. Irritable bowel syndrome, unspecified type  -     Linzess 72 MCG capsule capsule; Take 1 capsule by mouth Every Morning Before Breakfast. Alt with amitza doesn't take same time  Dispense: 30 capsule; Refill: 5      Instructed patient to Return to Office as needed for persistent or new symptoms and/or go to ER for worsening symptoms, patient voiced understanding.           Follow Up     Return in about 3 months (around 5/9/2024) for Next scheduled follow up.  Patient was given instructions and counseling regarding her condition or for health maintenance advice. Please see specific information pulled into the AVS if appropriate.

## 2024-02-09 NOTE — ASSESSMENT & PLAN NOTE
Hypertension is improving with treatment.  Continue current treatment regimen.  Dietary sodium restriction.  Continue current medications.  Ambulatory blood pressure monitoring.  Blood pressure will be reassessed in 3 months.  Discussed the importance of healthy diet, nutrition, and lifestyle. Recommend low salt, fat/cholesterol diet and avoid concentrated sweets. Encouraged DASH diet along with fresh fruits & vegetables and low fat dairy products. Counseled patient to exercise/walk as tolerated. Avoid tobacco and alcohol use.

## 2024-02-20 ENCOUNTER — TELEPHONE (OUTPATIENT)
Dept: FAMILY MEDICINE CLINIC | Facility: CLINIC | Age: 54
End: 2024-02-20
Payer: COMMERCIAL

## 2024-02-20 NOTE — TELEPHONE ENCOUNTER
Caller: DR DEYANIRA    Relationship:     Best call back number:     DR DEYANIRA [phone]         What was the call regarding: REQUESTING A PEER TO PEER REGARDING THE MEDICATION REQUEST FOR THE PATIENT       Fezolinetant (Veozah) 45 MG tablet       Is it okay if the provider responds through MyChart: PLEASE CALL AND ADVISE

## 2024-02-21 NOTE — TELEPHONE ENCOUNTER
Yes, please provide pt 1 month supply of Veozah..    Pt needs to take Veozah 1 pill once a day.     Patient informed samples available if needed. States has plenty for the moment. Did receive letter from insurance, may not be covered in the future. Would you like to move forward with Peer to Peer?

## 2024-02-29 ENCOUNTER — TELEPHONE (OUTPATIENT)
Dept: FAMILY MEDICINE CLINIC | Facility: CLINIC | Age: 54
End: 2024-02-29
Payer: COMMERCIAL

## 2024-02-29 NOTE — TELEPHONE ENCOUNTER
Patient called informed denied states it has been working she will ask Dr. Stewart when goes for pap if anything he can do. Will still send in letter for appeal on outside chance it will slip by.

## 2024-03-14 ENCOUNTER — TELEPHONE (OUTPATIENT)
Dept: FAMILY MEDICINE CLINIC | Facility: CLINIC | Age: 54
End: 2024-03-14
Payer: COMMERCIAL

## 2024-03-14 DIAGNOSIS — N95.1 HOT FLASH, MENOPAUSAL: ICD-10-CM

## 2024-03-14 RX ORDER — FEZOLINETANT 45 MG/1
45 TABLET, FILM COATED ORAL EVERY 24 HOURS
Qty: 30 TABLET | Refills: 3 | COMMUNITY
Start: 2024-03-14

## 2024-03-14 NOTE — TELEPHONE ENCOUNTER
Caller: Maryana Huber    Relationship: Self    Best call back number:     [phone]       What was the call regarding:   WHAT IS THE STATUS ON THE APPEAL LETTER SENT ON 2/29/24.

## 2024-03-18 ENCOUNTER — TELEPHONE (OUTPATIENT)
Dept: FAMILY MEDICINE CLINIC | Facility: CLINIC | Age: 54
End: 2024-03-18
Payer: COMMERCIAL

## 2024-03-18 NOTE — TELEPHONE ENCOUNTER
Caller: Maryana Huber    Relationship to patient: Self    Best call back number: [phone]     Patient is needing: PATIENT STATES HER MOM AND DAD ( MARLENY AND KRYSTIN KAPOOR) ARE GOING TO  HER MEDICATION TODAY. SHE CAN NOT GET OFF WORK BEFORE THE OFFICE CLOSES. PLEASE CALL PATIENT IF THIS IS AN ISSUE.

## 2024-03-31 ENCOUNTER — TELEMEDICINE (OUTPATIENT)
Dept: FAMILY MEDICINE CLINIC | Facility: TELEHEALTH | Age: 54
End: 2024-03-31
Payer: COMMERCIAL

## 2024-03-31 DIAGNOSIS — J22 LOWER RESPIRATORY INFECTION (E.G., BRONCHITIS, PNEUMONIA, PNEUMONITIS, PULMONITIS): Primary | ICD-10-CM

## 2024-03-31 RX ORDER — DEXTROMETHORPHAN HYDROBROMIDE AND PROMETHAZINE HYDROCHLORIDE 15; 6.25 MG/5ML; MG/5ML
5 SYRUP ORAL 4 TIMES DAILY PRN
Qty: 150 ML | Refills: 0 | Status: SHIPPED | OUTPATIENT
Start: 2024-03-31

## 2024-03-31 RX ORDER — AMOXICILLIN 500 MG/1
500 CAPSULE ORAL 2 TIMES DAILY
Qty: 20 CAPSULE | Refills: 0 | Status: SHIPPED | OUTPATIENT
Start: 2024-03-31 | End: 2024-04-10

## 2024-03-31 NOTE — PROGRESS NOTES
You have chosen to receive care through a telehealth visit.  Do you consent to use a video/audio connection for your medical care today? Yes     CHIEF COMPLAINT  No chief complaint on file.        HPI  Maryana Huber is a 54 y.o. female  presents with complaint of was treated for bronchitis with Zpack, prednisone and albuterol.  Still having productive cough with green sputum.  Denies fever, ear pain, sore throat.     Review of Systems  See HPI    Past Medical History:   Diagnosis Date    Abdominal cramping 07/17/2017    SEEN AT [facility] ER    Acute bronchitis     Anemia     Anxiety     Arthralgia of left temporomandibular joint 06/2021    Atypical chest pain 08/30/2015    SEEN AT [facility] ER    Breast asymmetry     Carpal tunnel syndrome, bilateral     Cervical ca 2010    S/P LEEP    Chest pain on breathing 01/2019    Chronic neck pain     Constipation     Costochondritis, acute     Cystitis 03/2019    Depression     Dysuria     Fibrocystic breast     GERD (gastroesophageal reflux disease)     Hemorrhoids     Hypertension     Hypoglycemia 07/2012    IBS (irritable bowel syndrome)     Influenza 02/12/2018    Left breast mass 12/17/2019    Left-sided chest pain 12/2019    Migraine     Nodule of anus 10/2018    Occipital headache 11/24/2017    Pelvic pain     Periodic headache syndrome 10/2019    Right ankle sprain 08/30/2011    SEEN AT [facility] ER    Rosacea     Urgency of micturition     Vitamin B 12 deficiency        Family History   Problem Relation Age of Onset    Colon polyps Mother     Colon polyps Father     Cancer Maternal Grandmother     Cancer Maternal Grandfather     No Known Problems Son     Colon cancer Maternal Aunt        Social History     Socioeconomic History    Marital status:    Tobacco Use    Smoking status: Never     Passive exposure: Never    Smokeless tobacco: Never   Vaping Use    Vaping status: Never Used   Substance and Sexual Activity    Alcohol use: No    Drug use: No    Sexual activity: Yes      Partners: Male     Comment: .       Maryana Huber  reports that she has never smoked. She has never been exposed to tobacco smoke. She has never used smokeless tobacco.               LMP  (LMP Unknown)     PHYSICAL EXAM  Physical Exam   Constitutional: She is oriented to person, place, and time. She appears well-developed and well-nourished. She does not have a sickly appearance. She does not appear ill.   HENT:   Head: Normocephalic and atraumatic.   Pulmonary/Chest: Effort normal.  No respiratory distress (persistent cough during visit).  Neurological: She is alert and oriented to person, place, and time.           Diagnoses and all orders for this visit:    1. Lower respiratory infection (e.g., bronchitis, pneumonia, pneumonitis, pulmonitis) (Primary)  -     amoxicillin (AMOXIL) 500 MG capsule; Take 1 capsule by mouth 2 (Two) Times a Day for 10 days.  Dispense: 20 capsule; Refill: 0  -     promethazine-dextromethorphan (PROMETHAZINE-DM) 6.25-15 MG/5ML syrup; Take 5 mL by mouth 4 (Four) Times a Day As Needed for Cough.  Dispense: 150 mL; Refill: 0    --take medications as prescribed  --increase fluids, rest as needed, tylenol or ibuprofen for pain  --f/u in 5-7 days if no improvement        FOLLOW-UP  As discussed during visit with PCP/[facility] if no improvement or Urgent Care/Emergency Department if worsening of symptoms    Patient verbalizes understanding of medication dosage, comfort measures, instructions for treatment and follow-up.    Mary Valentine, TERRELL  03/31/2024  10:59 EDT    The use of a video visit has been reviewed with the patient and verbal informed consent has been obtained. Myself and Maryana Huber participated in this visit. The patient is located in [address] [place], [place] [id number].    I am located in [place], [place]. [employer] and [email or web address] were utilized. I spent 8 minutes in the patient's chart for this visit.      Note Disclaimer: At [facility], we believe that sharing  information builds trust and better   relationships. You are receiving this note because you recently visited [facility]. It is possible you   will see health information before a provider has talked with you about it. This kind of information can   be easy to misunderstand. To help you fully understand what it means for your health, we urge you to   discuss this note with your provider.

## 2024-06-07 ENCOUNTER — OFFICE VISIT (OUTPATIENT)
Dept: INTERNAL MEDICINE | Facility: CLINIC | Age: 54
End: 2024-06-07
Payer: COMMERCIAL

## 2024-06-07 VITALS
HEIGHT: 66 IN | DIASTOLIC BLOOD PRESSURE: 68 MMHG | SYSTOLIC BLOOD PRESSURE: 110 MMHG | HEART RATE: 89 BPM | WEIGHT: 184 LBS | BODY MASS INDEX: 29.57 KG/M2 | TEMPERATURE: 98.7 F | OXYGEN SATURATION: 98 %

## 2024-06-07 DIAGNOSIS — G89.29 CHRONIC PAIN OF RIGHT KNEE: Primary | ICD-10-CM

## 2024-06-07 DIAGNOSIS — I10 ESSENTIAL HYPERTENSION: ICD-10-CM

## 2024-06-07 DIAGNOSIS — M25.561 CHRONIC PAIN OF RIGHT KNEE: Primary | ICD-10-CM

## 2024-06-07 DIAGNOSIS — J20.9 ACUTE BRONCHITIS, UNSPECIFIED ORGANISM: ICD-10-CM

## 2024-06-07 DIAGNOSIS — E78.00 PURE HYPERCHOLESTEROLEMIA: ICD-10-CM

## 2024-06-07 PROCEDURE — 99214 OFFICE O/P EST MOD 30 MIN: CPT | Performed by: STUDENT IN AN ORGANIZED HEALTH CARE EDUCATION/TRAINING PROGRAM

## 2024-06-07 RX ORDER — ALBUTEROL SULFATE 90 UG/1
2 AEROSOL, METERED RESPIRATORY (INHALATION) EVERY 6 HOURS PRN
Qty: 8.5 G | Refills: 5 | Status: SHIPPED | OUTPATIENT
Start: 2024-06-07

## 2024-06-07 RX ORDER — BENZONATATE 200 MG/1
200 CAPSULE ORAL 3 TIMES DAILY PRN
Qty: 30 CAPSULE | Refills: 0 | Status: SHIPPED | OUTPATIENT
Start: 2024-06-07

## 2024-06-07 RX ORDER — AZITHROMYCIN 250 MG/1
TABLET, FILM COATED ORAL
Qty: 6 TABLET | Refills: 0 | Status: SHIPPED | OUTPATIENT
Start: 2024-06-07

## 2024-06-07 NOTE — PROGRESS NOTES
"  Tobias Gillespie D.O.  Internal Medicine  [facility]  [address], [address]  [place] [address]  [phone]      Chief Complaint  Establish Care (Has a bark cough x 2 week)    SUBJECTIVE    History of Present Illness    Maryana Huber is a 54 y.o. female who presents to the office today as a new patient to establish care.   Previously saw Dr Jarvis with [facility].     Hyperlipidemia: denies vascular history. Not currently on Rx meds for cholesterol. Takes fish oil over the counter.   Lab Results   Component Value Date    CHOL 234 (H) 04/20/2022    CHLPL 225 (H) 11/17/2023    TRIG 76 11/17/2023    HDL 72 (H) 11/17/2023     (H) 11/17/2023       Gastroesophageal reflux disease: takes nexium as needed    Migraine: has sumatriptan as needed. Suffers from a few, 0-3 per month    Hypertension:  she takes valsartan 160 mg daily. Checks BP at home occasionally and gets normal results.     seasonal allergies: She takes montelukast 10 mg daily long term.  States the last few weeks she has been bringing up some greenish phlegm. Feels worse over the last 2 weeks. \"I can't take a deep breath without coughing\". No fever or chills over the last few weeks but does feel a little sick to her stomach when chest congestion coughs. No vomiting. No ear ache currently. \"I really haven't been feeling that bad\". Has been taking Mucinex OTC which doesn't seem to work but some leftover Tessalon perles seemed to help.    Arthritis: knee on the right and in the spine. She has no spine or knee surgical history. Uses Tylenol two tablets daily. Has seen an orthopedist before for her knee.     She follows with a dermatologist for eczema, rosacea, scalp dermatitis , adult acne. She has multiple creams and uses spironolactone 25 mg daily as needed.     Uses valtrex as needed for cold sores / aphthous ulcer.     Chronic idiopathic constipation, IBS-C: Follows with [facility]. Takes Linzess 72 mcg daily. She states " this works great.     Postmenopausal symptoms: she takes Veozah as needed for hot flashes.     Iiver cyst: has been monitored with [facility] .     Allergies   Allergen Reactions    Clarithromycin GI Intolerance     Stomach cramps     Levofloxacin Nausea And Vomiting    Nitrofurantoin Monohyd Macro Nausea And Vomiting    Trazodone And Nefazodone Confusion     NIGHTMARES    Hydrocodone Unknown - Low Severity     Nausea    Tetracycline Hcl Unknown - Low Severity        Outpatient Medications Marked as Taking for the 6/7/24 encounter (Office Visit) with Tobias Gillespie,    Medication Sig Dispense Refill    acetaminophen (Tylenol 8 Hour) 650 MG 8 hr tablet Take 1 tablet by mouth Every 8 (Eight) Hours As Needed for Moderate Pain. 30 tablet 1    albuterol sulfate  (90 Base) MCG/ACT inhaler Inhale 2 puffs Every 6 (Six) Hours As Needed for Wheezing or Shortness of Air. 8.7 g 5    clobetasol (TEMOVATE) 0.05 % external solution       esomeprazole (nexIUM) 40 MG capsule Take 1 capsule by mouth Daily. 90 capsule 0    Fezolinetant (Veozah) 45 MG tablet Take 1 tablet by mouth Daily. 30 tablet 3    fish oil-omega-3 fatty acids 1000 MG capsule Take 1 capsule by mouth Daily. 180 capsule 3    fluticasone (FLONASE) 50 MCG/ACT nasal spray 1 spray by Each Nare route Daily. 16 mL 9    hydrocortisone 2.5 % lotion       Ivermectin (Soolantra) 1 % cream Apply 1 Application topically to the face as directed Every Morning after cleansing 45 g 11    ketoconazole (NIZORAL) 2 % shampoo       Linzess 72 MCG capsule capsule Take 1 capsule by mouth Every Morning Before Breakfast. Alt with amitza doesn't take same time 30 capsule 5    metroNIDAZOLE (METROGEL) 0.75 % gel       montelukast (SINGULAIR) 10 MG tablet Take 1 tablet by mouth Every Night. 90 tablet 3    multivitamin with minerals (Multivitamin Adult) tablet tablet Take 1 tablet by mouth Daily for 360 days. 90 tablet 3    Sodium Fluoride 5000 PPM 1.1 % paste       spironolactone (ALDACTONE) 25  MG tablet Take 1 tablet by mouth Daily.      SUMAtriptan (Imitrex) 50 MG tablet Take one tablet at onset of headache. May repeat dose one time in 2 hours if headache not relieved. 9 tablet 5    tretinoin (RETIN-A) 0.025 % cream       valsartan (DIOVAN) 160 MG tablet Take 1 tablet by mouth Daily. 90 tablet 1    vitamin D (ERGOCALCIFEROL) 1.25 MG (52752 UT) capsule capsule Take 1 capsule by mouth 1 (One) Time Per Week. 16 capsule 3    [DISCONTINUED] albuterol sulfate  (90 Base) MCG/ACT inhaler Inhale 2 puffs Every 6 (Six) Hours As Needed for Wheezing or Shortness of Air. 8.7 g 5        Past Medical History:   Diagnosis Date    Abdominal cramping 07/17/2017    SEEN AT [facility]    Acute bronchitis     Anemia     Anxiety     Arthralgia of left temporomandibular joint 06/2021    Atypical chest pain 08/30/2015    SEEN AT [facility]    Breast asymmetry     Carpal tunnel syndrome, bilateral     Cervical ca 2010    S/P LEEP    Chest pain on breathing 01/2019    Chronic neck pain     Constipation     Costochondritis, acute     Cystitis 03/2019    Depression     Dysuria     Fibrocystic breast     GERD (gastroesophageal reflux disease)     Hemorrhoids     Hypertension     Hypoglycemia 07/2012    IBS (irritable bowel syndrome)     Influenza 02/12/2018    Left breast mass 12/17/2019    Left-sided chest pain 12/2019    Migraine     Nodule of anus 10/2018    Occipital headache 11/24/2017    Pelvic pain     Periodic headache syndrome 10/2019    Right ankle sprain 08/30/2011    SEEN AT [facility]    Rosacea     Urgency of micturition     Vitamin B 12 deficiency      Past Surgical History:   Procedure Laterality Date    APPENDECTOMY N/A     BREAST BIOPSY      BREAST LUMPECTOMY Right     BENIGN per pt    COLONOSCOPY N/A 12/19/2011    ENTIRE COLON WNL, RESCOPE IN 5 YRS, DR. JAMAR BELL AT [facility]    ENDOSCOPY N/A 06/14/2011    NORMAL ESOPHAGUS, NORMAL DUODENUM, A FEW BENIGN GASTRIC POLYPS, DR. DAVID SANCHEZ AT [facility]    FACIAL LACERATIONS REPAIR  "Right 07/06/2011    RIGHT EYEBROWN, DONE AT [facility]    LEEP N/A 08/16/2010    FOR SEVERE DYSPLASIA OF CERVIX, LARGE AREA OF NON UPTAKE IN THE POSTERIOR LIP OF THE CERVIX, AND A SMALL AREA ON THE ANTERIOR LIP OF THE CERVIX, DR. KAYLEIGH GHOTRA AT [facility]    MOHS SURGERY      forehead for basal cell carcinoma    VAGINAL DELIVERY N/A 02/01/2001    DR. YESSENIA SHINE     Family History   Problem Relation Age of Onset    Colon polyps Mother     Hypertension Mother     Colon polyps Father     Stroke Father     Ovarian cancer Maternal Grandmother     Pancreatic cancer Maternal Grandfather     Stroke Paternal Grandmother     No Known Problems Son     Colon cancer Maternal Aunt     reports that she has never smoked. She has never been exposed to tobacco smoke. She has never used smokeless tobacco. She reports that she does not drink alcohol and does not use drugs.    OBJECTIVE    Vital Signs:   /68   Pulse 89   Temp 98.7 °F (37.1 °C) (Oral)   Ht 167.6 cm (66\")   Wt 83.5 kg (184 lb)   SpO2 98%   BMI 29.70 kg/m²        Physical Exam  Vitals reviewed.   Constitutional:       General: She is not in acute distress.     Appearance: Normal appearance. She is not ill-appearing.   HENT:      Head: Normocephalic and atraumatic.      Right Ear: Tympanic membrane, ear canal and external ear normal. There is no impacted cerumen.      Left Ear: Tympanic membrane, ear canal and external ear normal. There is no impacted cerumen.      Mouth/Throat:      Mouth: Mucous membranes are moist.      Pharynx: Oropharynx is clear. Posterior oropharyngeal erythema (posterior pharynx) present. No oropharyngeal exudate.   Eyes:      General: No scleral icterus.  Cardiovascular:      Rate and Rhythm: Normal rate and regular rhythm.      Heart sounds: Normal heart sounds. No murmur heard.  Pulmonary:      Effort: Pulmonary effort is normal. No respiratory distress.      Breath sounds: Normal breath sounds. No wheezing or rhonchi.      Comments: " "Frequent cough with deep breathing  Musculoskeletal:      Right lower leg: No edema.      Left lower leg: No edema.   Lymphadenopathy:      Cervical: No cervical adenopathy.   Skin:     Coloration: Skin is not jaundiced.   Neurological:      Mental Status: She is alert.   Psychiatric:         Mood and Affect: Mood normal.         Behavior: Behavior normal.         Thought Content: Thought content normal.                             ASSESSMENT & PLAN     Diagnoses and all orders for this visit:    1. Chronic pain of right knee (Primary)  -Uses Tylenol two tablets daily. Has seen an orthopedist before for her knee. I do not have access to those records at this time  -advised her she can try voltaren gel to the knee if she wants to avoid systemic medication     2. Essential hypertension  - she takes valsartan 160 mg daily. Checks BP at home occasionally and gets normal results.   -BP well controlled 110/68 in office today  -continue current regimen, check Bmp today  -     Basic metabolic panel    3. Acute bronchitis, unspecified organism  -States the last few weeks she has been bringing up some greenish phlegm. Feels worse over the last 2 weeks. \"I can't take a deep breath without coughing\". No fever or chills over the last few weeks but does feel a little sick to her stomach when chest congestion coughs. No vomiting. No ear ache currently. \"I really haven't been feeling that bad\". Has been taking Mucinex OTC which doesn't seem to work but some leftover Tessalon perles seemed to help.  -on exam she has frequent cough with deep breathing. Vitals show she is normotensive, afebrile, satting 98% on room air, no acute distress.  -exam and history consistent with acute bronchitis. Advised her that cough can last for up to 4 weeks. She has purulent sputum production so I will treat empirically with Z alpesh. Will Rx albuterol inhaler and tessalon perles for symptomatic relief. Return to office if failure to improve or worsening " symptoms.   -     benzonatate (TESSALON) 200 MG capsule; Take 1 capsule by mouth 3 (Three) Times a Day As Needed for Cough.  Dispense: 30 capsule; Refill: 0  -     albuterol sulfate  (90 Base) MCG/ACT inhaler; Inhale 2 puffs Every 6 (Six) Hours As Needed for Wheezing or Shortness of Air.  Dispense: 8.7 g; Refill: 5  -     azithromycin (Zithromax Z-Leo) 250 MG tablet; Take 2 tablets by mouth on day 1, then 1 tablet daily on days 2-5  Dispense: 6 tablet; Refill: 0    4. Pure hypercholesterolemia  -denies vascular history. Not currently on Rx meds for cholesterol. Takes fish oil over the counter.   Lab Results   Component Value Date    CHOL 234 (H) 04/20/2022    CHLPL 225 (H) 11/17/2023    TRIG 76 11/17/2023    HDL 72 (H) 11/17/2023     (H) 11/17/2023   -I reviewed most recent lipid profile as above.   The 10-year ASCVD risk score (Alondra DK, et al., 2019) is: 1.7%    Values used to calculate the score:      Age: 54 years      Sex: Female      Is Non- : No      Diabetic: No      Tobacco smoker: No      Systolic Blood Pressure: 110 mmHg      Is BP treated: Yes      HDL Cholesterol: 72 mg/dL      Total Cholesterol: 225 mg/dL  -ascvd risk not elevated   -recommend continued lifestyle modifications           Follow Up  Return in about 4 months (around 10/7/2024) for Recheck.    Patient/family had no further questions at this time and verbalized understanding of the plan discussed today.

## 2024-06-08 LAB
BUN SERPL-MCNC: 11 MG/DL (ref 6–20)
BUN/CREAT SERPL: 14.7 (ref 7–25)
CALCIUM SERPL-MCNC: 9.5 MG/DL (ref 8.6–10.5)
CHLORIDE SERPL-SCNC: 103 MMOL/L (ref 98–107)
CO2 SERPL-SCNC: 25.5 MMOL/L (ref 22–29)
CREAT SERPL-MCNC: 0.75 MG/DL (ref 0.57–1)
EGFRCR SERPLBLD CKD-EPI 2021: 94.2 ML/MIN/1.73
GLUCOSE SERPL-MCNC: 90 MG/DL (ref 65–99)
POTASSIUM SERPL-SCNC: 4.2 MMOL/L (ref 3.5–5.2)
SODIUM SERPL-SCNC: 139 MMOL/L (ref 136–145)

## 2024-07-21 DIAGNOSIS — J30.89 ENVIRONMENTAL AND SEASONAL ALLERGIES: ICD-10-CM

## 2024-07-22 RX ORDER — MONTELUKAST SODIUM 10 MG/1
10 TABLET ORAL NIGHTLY
Qty: 90 TABLET | Refills: 3 | Status: SHIPPED | OUTPATIENT
Start: 2024-07-22

## 2024-07-26 DIAGNOSIS — I10 ESSENTIAL HYPERTENSION: Chronic | ICD-10-CM

## 2024-07-26 RX ORDER — VALSARTAN 160 MG/1
160 TABLET ORAL DAILY
Qty: 90 TABLET | Refills: 1 | Status: SHIPPED | OUTPATIENT
Start: 2024-07-26

## 2024-07-26 RX ORDER — ESOMEPRAZOLE MAGNESIUM 40 MG/1
40 CAPSULE, DELAYED RELEASE ORAL DAILY
Qty: 90 CAPSULE | Refills: 0 | Status: SHIPPED | OUTPATIENT
Start: 2024-07-26

## 2024-07-26 NOTE — TELEPHONE ENCOUNTER
Caller: CecileMaryana    Relationship: Self    Best call back number: [phone]     Requested Prescriptions:   Requested Prescriptions     Pending Prescriptions Disp Refills    esomeprazole (nexIUM) 40 MG capsule 90 capsule 0     Sig: Take 1 capsule by mouth Daily.    valsartan (DIOVAN) 160 MG tablet 90 tablet 1     Sig: Take 1 tablet by mouth Daily.        Pharmacy where request should be sent: [facility]     Last office visit with prescribing clinician: 6/7/2024   Last telemedicine visit with prescribing clinician: Visit date not found   Next office visit with prescribing clinician: 10/11/2024     Additional details provided by patient: ASKING FOR A 90 DAY SUPPLY     Does the patient have less than a 3 day supply:  [] Yes  [x] No    Would you like a call back once the refill request has been completed: [] Yes [x] No    If the office needs to give you a call back, can they leave a voicemail: [] Yes [x] No    Zahira Krause   07/26/24 10:12 EDT

## 2024-08-23 ENCOUNTER — OFFICE VISIT (OUTPATIENT)
Dept: INTERNAL MEDICINE | Facility: CLINIC | Age: 54
End: 2024-08-23
Payer: COMMERCIAL

## 2024-08-23 VITALS
DIASTOLIC BLOOD PRESSURE: 75 MMHG | WEIGHT: 183.4 LBS | OXYGEN SATURATION: 97 % | HEART RATE: 85 BPM | SYSTOLIC BLOOD PRESSURE: 123 MMHG | HEIGHT: 66 IN | BODY MASS INDEX: 29.47 KG/M2

## 2024-08-23 DIAGNOSIS — M25.561 ACUTE PAIN OF RIGHT KNEE: Primary | ICD-10-CM

## 2024-08-23 PROCEDURE — 99213 OFFICE O/P EST LOW 20 MIN: CPT | Performed by: STUDENT IN AN ORGANIZED HEALTH CARE EDUCATION/TRAINING PROGRAM

## 2024-08-23 NOTE — PROGRESS NOTES
Seth Harrell M.D.  Internal Medicine  [facility]  [address], [address]  [place] [address]  [phone]      Chief Complaint  Knee Pain (Right knee pain x 2 weeks, throbbing pain, unable to touch knee - very painful when touched/)    SUBJECTIVE    History of Present Illness    Maryana Huber is a 54 y.o. female with history of seasonal allergies, arthritis, IBS-C who presents to the office today as an established patient of Dr. Gillespie here for an acute visit.    Patient recently established care with Dr. Gillespie in June.    Anterior knee pain 2 weeks no trauma.  States she has been told she has arthritis in the past.    Knee is very tender. No pain walking, bending. Pain radiates down right shin. Feels it is swollen but states it does not look swollen    Taking tylenol arthritis without much relief. Ibuprofen not helping.  Ibuprofen makes her constipated    Cannot kneel due to pain.  Issue is putting pressure on her knee and not bending in itself    Works in [employer].  She sits a lot for work.  She is not very active in general.  Walks for exercise.        Review of Systems    Allergies   Allergen Reactions    Clarithromycin GI Intolerance     Stomach cramps     Levofloxacin Nausea And Vomiting    Nitrofurantoin Monohyd Macro Nausea And Vomiting    Trazodone And Nefazodone Confusion     NIGHTMARES    Hydrocodone Unknown - Low Severity     Nausea    Tetracycline Hcl Unknown - Low Severity        Outpatient Medications Marked as Taking for the 8/23/24 encounter (Office Visit) with Seth Harrell MD   Medication Sig Dispense Refill    acetaminophen (Tylenol 8 Hour) 650 MG 8 hr tablet Take 1 tablet by mouth Every 8 (Eight) Hours As Needed for Moderate Pain. 30 tablet 1    albuterol sulfate  (90 Base) MCG/ACT inhaler Inhale 2 puffs Every 6 (Six) Hours As Needed for Wheezing or Shortness of Air. 8.5 g 5    clobetasol (TEMOVATE) 0.05 % external solution       esomeprazole (nexIUM)  40 MG capsule Take 1 capsule by mouth Daily. 90 capsule 0    Fezolinetant (Veozah) 45 MG tablet Take 1 tablet by mouth Daily. 30 tablet 11    fish oil-omega-3 fatty acids 1000 MG capsule Take 1 capsule by mouth Daily. 180 capsule 3    fluticasone (FLONASE) 50 MCG/ACT nasal spray Instill 1 spray into each nostril as directed by provider Daily. 16 mL 9    hydrocortisone 2.5 % lotion       Ivermectin (Soolantra) 1 % cream Apply 1 Application topically to the face as directed Every Morning after cleansing 45 g 11    ketoconazole (NIZORAL) 2 % shampoo       Linzess 72 MCG capsule capsule Take 1 capsule by mouth Every Morning Before Breakfast. Alt with amitza, don't take same time 30 capsule 5    metroNIDAZOLE (METROGEL) 0.75 % gel       montelukast (SINGULAIR) 10 MG tablet Take 1 tablet by mouth Every Night. 90 tablet 3    multivitamin with minerals (Multivitamin Adult) tablet tablet Take 1 tablet by mouth Daily for 360 days. 90 tablet 3    Sodium Fluoride 5000 PPM 1.1 % paste       Soolantra 1 % cream       SUMAtriptan (Imitrex) 50 MG tablet Take one tablet at onset of headache. May repeat dose one time in 2 hours if headache not relieved. 9 tablet 5    tretinoin (RETIN-A) 0.025 % cream       valACYclovir (VALTREX) 1000 MG tablet Take 1 tablet by mouth 3 (Three) Times a Day. Prn cold sore 9 tablet 3    valsartan (DIOVAN) 160 MG tablet Take 1 tablet by mouth Daily. 90 tablet 1    vitamin D (ERGOCALCIFEROL) 1.25 MG (00935 UT) capsule capsule Take 1 capsule by mouth 1 (One) Time Per Week. 16 capsule 3        Past Medical History:   Diagnosis Date    Abdominal cramping 07/17/2017    SEEN AT [facility]    Acute bronchitis     Anemia     Anxiety     Arthralgia of left temporomandibular joint 06/2021    Atypical chest pain 08/30/2015    SEEN AT [facility]    Breast asymmetry     Carpal tunnel syndrome, bilateral     Cervical ca 2010    S/P LEEP    Chest pain on breathing 01/2019    Chronic neck pain     Constipation      "Costochondritis, acute     Cystitis 03/2019    Depression     Dysuria     Fibrocystic breast     GERD (gastroesophageal reflux disease)     Hemorrhoids     Hypertension     Hypoglycemia 07/2012    IBS (irritable bowel syndrome)     Influenza 02/12/2018    Left breast mass 12/17/2019    Left-sided chest pain 12/2019    Migraine     Nodule of anus 10/2018    Occipital headache 11/24/2017    Pelvic pain     Periodic headache syndrome 10/2019    Right ankle sprain 08/30/2011    [facility]    Rosacea     Urgency of micturition     Vitamin B 12 deficiency      Past Surgical History:   Procedure Laterality Date    APPENDECTOMY N/A     BREAST BIOPSY      BREAST LUMPECTOMY Right     BENIGN per pt    COLONOSCOPY N/A 12/19/2011    ENTIRE COLON WNL, RESCOPE IN 5 YRS, DR[facility] JAMAR [facility]LL [facility]    ENDOSCOPY N/A 06/14/2011    NORMAL ESOPHAGUS, NORMAL DUODENUM, A FEW BENIGN GASTRIC POLYPS, DR. DAVID SANCHEZ [facility]    FACIAL LACERATIONS REPAIR Right 07/06/2011    RIGHT EYEBROWN, DONE AT [facility]    LEEP N/A 08/16/2010    FOR SEVERE DYSPLASIA OF CERVIX, LARGE AREA OF NON UPTAKE IN THE POSTERIOR LIP OF THE CERVIX, AND A SMALL AREA ON THE ANTERIOR LIP OF THE CERVIX, DR. KAYLEIGH GHOTRA [facility]    MOHS SURGERY      forehead for basal cell carcinoma    VAGINAL DELIVERY N/A 02/01/2001    DR. YESSENIA SHINE     Family History   Problem Relation Age of Onset    Colon polyps Mother     Hypertension Mother     Colon polyps Father     Stroke Father     Ovarian cancer Maternal Grandmother     Pancreatic cancer Maternal Grandfather     Stroke Paternal Grandmother     No Known Problems Son     Colon cancer Maternal Aunt     reports that she has never smoked. She has never been exposed to tobacco smoke. She has never used smokeless tobacco. She reports that she does not drink alcohol and does not use drugs.    OBJECTIVE    Vital Signs:   /75   Pulse 85   Ht 167.6 cm (65.98\")   Wt 83.2 kg (183 lb 6.4 oz)   SpO2 97%   BMI 29.62 kg/m²   "   Physical Exam  Constitutional:       General: She is not in acute distress.     Appearance: Normal appearance.   Pulmonary:      Effort: Pulmonary effort is normal. No respiratory distress.   Musculoskeletal:      Right knee: Crepitus present. Normal range of motion. Normal patellar mobility.      Instability Tests: Anterior drawer test negative. Posterior drawer test negative.      Left knee: Crepitus present. Normal range of motion. Normal patellar mobility.      Instability Tests: Anterior drawer test negative. Posterior drawer test negative.      Comments: Very tender over right patellar tendon.  Normal gait   Neurological:      Mental Status: She is alert. Mental status is at baseline.   Psychiatric:         Mood and Affect: Mood normal.         Behavior: Behavior normal.         Thought Content: Thought content normal.            The following data was reviewed by: Seth Harrell MD on 08/23/2024:  CMP          11/17/2023    08:22 6/7/2024    15:54   CMP   Glucose 92  90    BUN 14  11    Creatinine 0.62  0.75    Sodium 141  139    Potassium 4.5  4.2    Chloride 103  103    Calcium 9.9  9.5    Total Protein 6.6     Albumin 4.8     Globulin 1.8     Total Bilirubin <0.2     Alkaline Phosphatase 60     AST (SGOT) 10     ALT (SGPT) 11     BUN/Creatinine Ratio 22.6  14.7      CBC w/diff          11/17/2023    08:22   CBC w/Diff   WBC 7.60    RBC 4.14    Hemoglobin 12.1    Hematocrit 37.1    MCV 89.6    MCH 29.2    MCHC 32.6    RDW 12.8    Platelets 336    Neutrophil Rel % 60.6    Lymphocyte Rel % 29.2    Monocyte Rel % 8.4    Eosinophil Rel % 1.2    Basophil Rel % 0.5      Lipid Panel          11/17/2023    08:22   Lipid Panel   Total Cholesterol 225    Triglycerides 76    HDL Cholesterol 72    VLDL Cholesterol 13    LDL Cholesterol  140      TSH          11/17/2023    08:22 2/2/2024    14:19   TSH   TSH 4.490  2.410        Data reviewed : Recent PCP and Ortho note              ASSESSMENT & PLAN     Diagnoses and all  orders for this visit:    1. Acute pain of right knee (Primary)  -     Ambulatory Referral to Orthopedic Surgery  -     XR Knee 1 or 2 View Right (In Office)        54-year-old with anterior knee pain for 2 weeks.  She is really not having any pain with walking or activity however she is very tender in her anterior knee over the patellar tendon.  Possibly prepatellar bursitis Or maybe patellofemoral pain syndrome.   Checked x-ray.  No acute fracture on my read.  She does have osteoarthritis changes.  I think she should go see orthopedic surgery for evaluation.    Health Maintenance Due   Topic Date Due    COLORECTAL CANCER SCREENING  Never done    Pneumococcal Vaccine 0-64 (1 of 2 - PCV) Never done    TDAP/TD VACCINES (1 - Tdap) Never done    HEPATITIS C SCREENING  Never done    ZOSTER VACCINE (1 of 2) Never done    PAP SMEAR  02/18/2022    INFLUENZA VACCINE  08/01/2024        Follow Up  No follow-ups on file.    Patient/family had no further questions at this time and verbalized understanding of the plan discussed today.

## 2024-10-11 ENCOUNTER — OFFICE VISIT (OUTPATIENT)
Dept: INTERNAL MEDICINE | Facility: CLINIC | Age: 54
End: 2024-10-11
Payer: COMMERCIAL

## 2024-10-11 VITALS
SYSTOLIC BLOOD PRESSURE: 112 MMHG | WEIGHT: 187 LBS | OXYGEN SATURATION: 96 % | BODY MASS INDEX: 30.05 KG/M2 | DIASTOLIC BLOOD PRESSURE: 66 MMHG | HEART RATE: 93 BPM | HEIGHT: 66 IN

## 2024-10-11 DIAGNOSIS — G89.29 CHRONIC CHEST PAIN: ICD-10-CM

## 2024-10-11 DIAGNOSIS — R07.9 CHRONIC CHEST PAIN: ICD-10-CM

## 2024-10-11 DIAGNOSIS — M19.049 HAND ARTHRITIS: Primary | ICD-10-CM

## 2024-10-11 DIAGNOSIS — R06.09 DOE (DYSPNEA ON EXERTION): ICD-10-CM

## 2024-10-11 PROCEDURE — 99214 OFFICE O/P EST MOD 30 MIN: CPT | Performed by: STUDENT IN AN ORGANIZED HEALTH CARE EDUCATION/TRAINING PROGRAM

## 2024-10-11 RX ORDER — CELECOXIB 100 MG/1
100 CAPSULE ORAL 2 TIMES DAILY PRN
Qty: 60 CAPSULE | Refills: 1 | Status: SHIPPED | OUTPATIENT
Start: 2024-10-11

## 2024-10-11 NOTE — PROGRESS NOTES
"  Tobias Gillespie D.O.  Internal Medicine  [facility]  [address], [address]  [place] [address]  [phone]      Chief Complaint  Hand pain    SUBJECTIVE    History of Present Illness    Maryana Huber is a 54 y.o. female who presents to the office today as an established patient that last saw me on 6/7/2024.     States she is particularly concerned regarding hand arthritis. \"I think I have rheumatoid arthritis\". Seems to be knots at the ends of the fingers. Fingers sometime swell at the joints. She feels stiff in her morning  \"in general\" and takes a while to get moving. She rates this as around 5-10 minutes.   States her hands tend to hurt more at the fingertips. 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 She takes Aleve over the counter twice daily and sometimes during the day uses a few tylenol.     States at night when she lies flat she feels really short of air. States it wakes her up. She has to sit up in the middle of the night which help. Her legs do swell after sitting all day. They can even be swollen when she wakes up. She elevates her legs at night. She has mentioned this to cardiology before as well as chest pain under the left breast with activity. She was recommended to have a stress test but didn't get that done.     Allergies   Allergen Reactions    Clarithromycin GI Intolerance     Stomach cramps     Levofloxacin Nausea And Vomiting    Nitrofurantoin Monohyd Macro Nausea And Vomiting    Trazodone And Nefazodone Confusion     NIGHTMARES    Hydrocodone Unknown - Low Severity     Nausea    Tetracycline Hcl Unknown - Low Severity        Outpatient Medications Marked as Taking for the 10/11/24 encounter (Office Visit) with Tobias Gillespie, DO   Medication Sig Dispense Refill    acetaminophen (Tylenol 8 Hour) 650 MG 8 hr tablet Take 1 tablet by mouth Every 8 (Eight) Hours As Needed for Moderate Pain. 30 tablet 1    albuterol sulfate  (90 Base) " MCG/ACT inhaler Inhale 2 puffs Every 6 (Six) Hours As Needed for Wheezing or Shortness of Air. 8.5 g 5    esomeprazole (nexIUM) 40 MG capsule Take 1 capsule by mouth Daily. 90 capsule 0    Fezolinetant (Veozah) 45 MG tablet Take 1 tablet by mouth Daily. 30 tablet 11    fish oil-omega-3 fatty acids 1000 MG capsule Take 1 capsule by mouth Daily. 180 capsule 3    fluticasone (FLONASE) 50 MCG/ACT nasal spray Instill 1 spray into each nostril as directed by provider Daily. 16 mL 9    Linzess 72 MCG capsule capsule Take 1 capsule by mouth Every Morning Before Breakfast. Alt with amitza, don't take same time 30 capsule 5    montelukast (SINGULAIR) 10 MG tablet Take 1 tablet by mouth Every Night. 90 tablet 3    multivitamin with minerals (Multivitamin Adult) tablet tablet Take 1 tablet by mouth Daily for 360 days. 90 tablet 3    Sodium Fluoride 5000 PPM 1.1 % paste       SUMAtriptan (Imitrex) 50 MG tablet Take one tablet at onset of headache. May repeat dose one time in 2 hours if headache not relieved. 9 tablet 5    tretinoin (RETIN-A) 0.025 % cream       valACYclovir (VALTREX) 1000 MG tablet Take 1 tablet by mouth 3 (Three) Times a Day. Prn cold sore 9 tablet 3    valsartan (DIOVAN) 160 MG tablet Take 1 tablet by mouth Daily. 90 tablet 1    vitamin D (ERGOCALCIFEROL) 1.25 MG (19288 UT) capsule capsule Take 1 capsule by mouth 1 (One) Time Per Week. 16 capsule 3        Past Medical History:   Diagnosis Date    Abdominal cramping 07/17/2017    SEEN AT [facility] ER    Acute bronchitis     Anemia     Anxiety     Arthralgia of left temporomandibular joint 06/2021    Atypical chest pain 08/30/2015    SEEN AT [facility] ER    Breast asymmetry     Carpal tunnel syndrome, bilateral     Cervical ca 2010    S/P LEEP    Chest pain on breathing 01/2019    Chronic neck pain     Constipation     Costochondritis, acute     Cystitis 03/2019    Depression     Dysuria     Fibrocystic breast     GERD (gastroesophageal reflux disease)     Hemorrhoids      "Hypertension     Hypoglycemia 07/2012    IBS (irritable bowel syndrome)     Influenza 02/12/2018    Left breast mass 12/17/2019    Left-sided chest pain 12/2019    Migraine     Nodule of anus 10/2018    Occipital headache 11/24/2017    Pelvic pain     Periodic headache syndrome 10/2019    Right ankle sprain 08/30/2011    SEEN AT [facility] ER    Rosacea     Urgency of micturition     Vitamin B 12 deficiency        OBJECTIVE    Vital Signs:   /66   Pulse 93   Ht 167.6 cm (66\")   Wt 84.8 kg (187 lb)   SpO2 96%   BMI 30.18 kg/m²        Physical Exam  Vitals reviewed.   Constitutional:       General: She is not in acute distress.     Appearance: She is obese. She is not ill-appearing.   Eyes:      General: No scleral icterus.  Cardiovascular:      Rate and Rhythm: Normal rate and regular rhythm.      Heart sounds: Normal heart sounds. No murmur heard.  Pulmonary:      Effort: Pulmonary effort is normal. No respiratory distress.      Breath sounds: Normal breath sounds. No stridor. No wheezing.   Musculoskeletal:      Right lower leg: No edema.      Left lower leg: No edema.      Comments: Bilateral hands without swelling, redness or erythema.  There is no tenderness palpation of the MCP joints nor synovitis.  There are scattered DIP and PIP nodules.   Skin:     Coloration: Skin is not jaundiced.   Neurological:      Mental Status: She is alert.   Psychiatric:         Mood and Affect: Mood normal.         Behavior: Behavior normal.         Thought Content: Thought content normal.                             ASSESSMENT & PLAN     Diagnoses and all orders for this visit:    1. Hand arthritis (Primary)  -Her hand symptoms as well as history and physical exam are more consistent with osteoarthritis than rheumatoid arthritis.  The patient was unsure of the differences between these 2 conditions.  I discussed with her the difference in the approaches of treatment for osteoarthritis.  I recommend she continue regular use of " her hands.  She can use Tylenol arthritis or extra strength Tylenol 2 tabs up to 3 times daily.  She can use Voltaren gel over-the-counter.  I would like to switch her to a NSAID that is potentially safer for her stomach long-term.  We will do a trial of Celebrex 100 mg twice daily.  Advised her to not take this medication with any other NSAID over-the-counter.  Certainly if she fails to have improvement in her hands I can refer to hand specialist.  -     celecoxib (CeleBREX) 100 MG capsule; Take 1 capsule by mouth 2 (Two) Times a Day As Needed for Mild Pain.  Dispense: 60 capsule; Refill: 1    2. ALVES (dyspnea on exertion)  3. Chronic chest pain  I reviewed January 12, 2024 cardiology progress note.  They plan for treadmill stress test as well as echocardiogram.  Patient did have echocardiogram and I reviewed the report of that from December 2023.  It shows LVEF 66.3%, left ventricular diastolic function that was normal.  The patient however did not follow through with the exercise stress test.  I strongly encouraged her to follow through with this test and I will reorder it for her today.  On exam she is normotensive, lung sounds are clear, she is euvolemic.          Follow Up  Return in about 4 months (around 2/11/2025) for Annual physical.    Patient/family had no further questions at this time and verbalized understanding of the plan discussed today.

## 2024-10-26 RX ORDER — ESOMEPRAZOLE MAGNESIUM 40 MG/1
40 CAPSULE, DELAYED RELEASE ORAL DAILY
Qty: 90 CAPSULE | Refills: 0 | Status: CANCELLED | OUTPATIENT
Start: 2024-10-26

## 2024-10-28 DIAGNOSIS — K58.9 IRRITABLE BOWEL SYNDROME, UNSPECIFIED TYPE: ICD-10-CM

## 2024-10-28 RX ORDER — LINACLOTIDE 72 UG/1
72 CAPSULE, GELATIN COATED ORAL
Qty: 90 CAPSULE | Refills: 2 | Status: SHIPPED | OUTPATIENT
Start: 2024-10-28

## 2024-10-28 RX ORDER — KETOCONAZOLE 20 MG/ML
SHAMPOO TOPICAL
OUTPATIENT
Start: 2024-10-28

## 2024-10-28 RX ORDER — ESOMEPRAZOLE MAGNESIUM 40 MG/1
40 CAPSULE, DELAYED RELEASE ORAL DAILY
Qty: 90 CAPSULE | Refills: 0 | Status: SHIPPED | OUTPATIENT
Start: 2024-10-28

## 2024-10-28 RX ORDER — SODIUM FLUORIDE 6 MG/ML
PASTE, DENTIFRICE DENTAL
OUTPATIENT
Start: 2024-10-28

## 2024-11-03 ENCOUNTER — TELEMEDICINE (OUTPATIENT)
Dept: FAMILY MEDICINE CLINIC | Facility: TELEHEALTH | Age: 54
End: 2024-11-03
Payer: COMMERCIAL

## 2024-11-03 VITALS — HEIGHT: 66 IN | BODY MASS INDEX: 28.77 KG/M2 | WEIGHT: 179 LBS

## 2024-11-03 DIAGNOSIS — J01.40 ACUTE PANSINUSITIS, RECURRENCE NOT SPECIFIED: Primary | ICD-10-CM

## 2024-11-03 DIAGNOSIS — J04.0 LARYNGITIS: ICD-10-CM

## 2024-11-03 RX ORDER — DEXTROMETHORPHAN HYDROBROMIDE AND PROMETHAZINE HYDROCHLORIDE 15; 6.25 MG/5ML; MG/5ML
5 SYRUP ORAL NIGHTLY PRN
Qty: 118 ML | Refills: 0 | Status: SHIPPED | OUTPATIENT
Start: 2024-11-03

## 2024-11-03 RX ORDER — GUAIFENESIN 600 MG/1
600 TABLET, EXTENDED RELEASE ORAL 2 TIMES DAILY
Qty: 28 TABLET | Refills: 0 | Status: SHIPPED | OUTPATIENT
Start: 2024-11-03 | End: 2024-11-17

## 2024-11-03 RX ORDER — AMOXICILLIN 875 MG/1
875 TABLET, COATED ORAL 2 TIMES DAILY
Qty: 14 TABLET | Refills: 0 | Status: SHIPPED | OUTPATIENT
Start: 2024-11-03 | End: 2024-11-10

## 2024-11-03 NOTE — PROGRESS NOTES
You have chosen to receive care through a telehealth visit.  Do you consent to use a video/audio connection for your medical care today? Yes     HPI  Maryana Huber is a 54 y.o. female  presents with complaint of sinus problem, voice loss.  She reports that she has a lot of nasal congestion and has had a sore throat for several days.  She also reports that the sore throat is improving but she is coughing up thick green mucus.  She feels that she needs an antibiotic because her symptoms keep lingering.  She also has has loss of voice.  Her symptoms are noted in the ROS portion of this visit.  Over-the-counter she has tried Mucinex and she had a couple Augmentin left and broke that into pieces and took it.  She reports that she has to break it into pieces or she is unable to take it bothers her stomach.  He has concerns about taking a steroid because reports recently being on one. She has had her symptoms for 5 days.    Review of Systems   Constitutional:  Positive for chills and fever (in the beginning). Negative for fatigue. Diaphoresis: at first.  HENT:  Positive for congestion (green, thick), postnasal drip, sinus pressure, sinus pain, sore throat (improving) and voice change. Negative for sneezing.    Respiratory:  Positive for cough. Negative for chest tightness, shortness of breath and wheezing.    Gastrointestinal:  Positive for nausea (resolved). Negative for diarrhea.   Musculoskeletal:  Myalgias: resolved.   Neurological:  Positive for headaches.       Past Medical History:   Diagnosis Date    Abdominal cramping 07/17/2017    SEEN AT [facility] ER    Acute bronchitis     Anemia     Anxiety     Arthralgia of left temporomandibular joint 06/2021    Atypical chest pain 08/30/2015    SEEN AT [facility] ER    Breast asymmetry     Carpal tunnel syndrome, bilateral     Cervical ca 2010    S/P LEEP    Chest pain on breathing 01/2019    Chronic neck pain     Constipation     Costochondritis, acute     Cystitis 03/2019     "Depression     Dysuria     Fibrocystic breast     GERD (gastroesophageal reflux disease)     Hemorrhoids     Hypertension     Hypoglycemia 07/2012    IBS (irritable bowel syndrome)     Influenza 02/12/2018    Left breast mass 12/17/2019    Left-sided chest pain 12/2019    Migraine     Nodule of anus 10/2018    Occipital headache 11/24/2017    Pelvic pain     Periodic headache syndrome 10/2019    Right ankle sprain 08/30/2011    SEEN AT [facility] ER    Rosacea     Urgency of micturition     Vitamin B 12 deficiency        Family History   Problem Relation Age of Onset    Colon polyps Mother     Hypertension Mother     Colon polyps Father     Stroke Father     Ovarian cancer Maternal Grandmother     Pancreatic cancer Maternal Grandfather     Stroke Paternal Grandmother     No Known Problems Son     Colon cancer Maternal Aunt        Social History     Socioeconomic History    Marital status:    Tobacco Use    Smoking status: Never     Passive exposure: Never    Smokeless tobacco: Never   Vaping Use    Vaping status: Never Used   Substance and Sexual Activity    Alcohol use: Never    Drug use: Never    Sexual activity: Yes     Partners: Male     Comment: .       Maryana Huber  reports that she has never smoked. She has never been exposed to tobacco smoke. She has never used smokeless tobacco.       Ht 167.6 cm (66\")   Wt 81.2 kg (179 lb)   LMP  (LMP Unknown)   Breastfeeding No   BMI 28.89 kg/m²     PHYSICAL EXAM  Physical Exam   Constitutional: She is oriented to person, place, and time. She appears well-developed.   HENT:   Head: Normocephalic and atraumatic.   Nose: Congestion present. Right sinus exhibits maxillary sinus tenderness (Patient directed exam) and frontal sinus tenderness (Patient directed exam). Left sinus exhibits maxillary sinus tenderness (Patient directed exam) and frontal sinus tenderness (Patient directed exam).   Voice loss noted at this   Eyes: Lids are normal. Right eye exhibits no " discharge. Left eye exhibits no discharge. Right conjunctiva is not injected. Left conjunctiva is not injected.   Pulmonary/Chest:  No respiratory distress (Cough noted at visit).  Neurological: She is alert and oriented to person, place, and time. No cranial nerve deficit.   Psychiatric: She has a normal mood and affect. Her speech is normal and behavior is normal. Judgment and thought content normal.       Results for orders placed or performed during the hospital encounter of 09/04/24   POC Urinalysis Dipstick, Multipro (Automated Dipstick)    Collection Time: 09/04/24  3:19 PM    Specimen: Urine   Result Value Ref Range    Color Yellow     Clarity, UA Clear     Glucose, UA Negative mg/dL    Bilirubin Negative     Ketones, UA Negative     Specific Gravity  1.020 1.005 - 1.030    Blood, UA Negative     pH, Urine 5.5 5.0 - 8.0    Protein, POC Negative mg/dL    Urobilinogen, UA 0.2 E.U./dL     Nitrite, UA Negative     Leukocytes Trace (A)        Diagnoses and all orders for this visit:    1. Acute pansinusitis, recurrence not specified (Primary)    2. Laryngitis    Other orders  -     promethazine-dextromethorphan (PROMETHAZINE-DM) 6.25-15 MG/5ML syrup; Take 5 mL by mouth At Night As Needed for Cough.  Dispense: 118 mL; Refill: 0  -     guaiFENesin (Mucinex) 600 MG 12 hr tablet; Take 1 tablet by mouth 2 (Two) Times a Day for 14 days.  Dispense: 28 tablet; Refill: 0  -     amoxicillin (AMOXIL) 875 MG tablet; Take 1 tablet by mouth 2 (Two) Times a Day for 7 days.  Dispense: 14 tablet; Refill: 0    You have been prescribed a wait and see antibiotic for sinusitis. Try Mucinex as ordered with lots of water to create nice thin secretions.A sinusitis requiring antibiotics requires symptoms for 7 to 10  Days, fever and purulent drainage. That being said, if your symptom persist or worsen you may take the prescribed antibiotic as directed. If you take the antibiotic be sure and take probiotics or eat yogurt to replace the  good bacteria that antibiotics destroy along with the bad bacteria. Take antibiotic and probiotic two hours apart.  Promethazine DM as needed for nighttime cough  Do not drive after taking promethazine DM as it may make you drowsy  Let your voice rest  Hydrate well    FOLLOW-UP  If symptoms worsen or persist follow up with PCP, [facility] or Urgent Care    Patient verbalizes understanding of medication dosage, comfort measures, instructions for treatment and follow-up.    Deneen Ward, APRN  11/03/2024  12:11 EST    The use of a video visit has been reviewed with the patient and verbal informed consent has been obtained. Myself and Maryana Huber participated in this visit. The patient is located in [address] [place], [place] [id number].    I am located in [place], [place]. [id number] and [employer] were utilized. I spent 22 minutes in the patient's chart for this visit.

## 2024-11-07 ENCOUNTER — PATIENT ROUNDING (BHMG ONLY) (OUTPATIENT)
Dept: URGENT CARE | Facility: CLINIC | Age: 54
End: 2024-11-07
Payer: COMMERCIAL

## 2024-11-07 NOTE — ED NOTES
Thank you for letting us care for you during your recent visit at [facility]. We would love to hear about your experience with us.         We’re always looking for ways to make our patients’ experiences even better. Do you have any recommendations on ways we may improve?         I appreciate you taking the time to respond. Please be on the lookout for a survey about your recent visit from [facility] via text or email. We would greatly appreciate if you could fill that out and turn it back in. We want your voice to be heard and we value your feedback.         Thank you for choosing [facility] for your healthcare needs.

## 2024-12-30 ENCOUNTER — TRANSCRIBE ORDERS (OUTPATIENT)
Dept: ADMINISTRATIVE | Facility: HOSPITAL | Age: 54
End: 2024-12-30
Payer: COMMERCIAL

## 2024-12-30 DIAGNOSIS — Z12.31 VISIT FOR SCREENING MAMMOGRAM: Primary | ICD-10-CM

## 2025-01-12 DIAGNOSIS — J30.2 SEASONAL ALLERGIES: ICD-10-CM

## 2025-01-12 DIAGNOSIS — I10 ESSENTIAL HYPERTENSION: Chronic | ICD-10-CM

## 2025-01-12 DIAGNOSIS — B00.9 HSV (HERPES SIMPLEX VIRUS) INFECTION: ICD-10-CM

## 2025-01-13 RX ORDER — VALACYCLOVIR HYDROCHLORIDE 1 G/1
1000 TABLET, FILM COATED ORAL 3 TIMES DAILY
Qty: 9 TABLET | Refills: 3 | OUTPATIENT
Start: 2025-01-13

## 2025-01-13 RX ORDER — VALSARTAN 160 MG/1
160 TABLET ORAL DAILY
Qty: 90 TABLET | Refills: 1 | Status: SHIPPED | OUTPATIENT
Start: 2025-01-13

## 2025-01-13 RX ORDER — ESOMEPRAZOLE MAGNESIUM 40 MG/1
40 CAPSULE, DELAYED RELEASE ORAL DAILY
Qty: 90 CAPSULE | Refills: 0 | Status: SHIPPED | OUTPATIENT
Start: 2025-01-13

## 2025-01-13 RX ORDER — FLUTICASONE PROPIONATE 50 MCG
1 SPRAY, SUSPENSION (ML) NASAL DAILY
OUTPATIENT
Start: 2025-01-13

## 2025-02-27 ENCOUNTER — HOSPITAL ENCOUNTER (OUTPATIENT)
Dept: MAMMOGRAPHY | Facility: HOSPITAL | Age: 55
Discharge: HOME | End: 2025-02-27
Admitting: OBSTETRICS & GYNECOLOGY
Payer: COMMERCIAL

## 2025-02-27 DIAGNOSIS — Z12.31 VISIT FOR SCREENING MAMMOGRAM: ICD-10-CM

## 2025-02-27 PROCEDURE — 77063 BREAST TOMOSYNTHESIS BI: CPT

## 2025-02-27 PROCEDURE — 77067 SCR MAMMO BI INCL CAD: CPT

## 2025-04-24 DIAGNOSIS — K58.9 IRRITABLE BOWEL SYNDROME, UNSPECIFIED TYPE: ICD-10-CM

## 2025-04-24 RX ORDER — LINACLOTIDE 72 UG/1
72 CAPSULE, GELATIN COATED ORAL
Qty: 90 CAPSULE | Refills: 2 | Status: SHIPPED | OUTPATIENT
Start: 2025-04-24